# Patient Record
Sex: MALE | Race: BLACK OR AFRICAN AMERICAN | Employment: UNEMPLOYED | URBAN - METROPOLITAN AREA
[De-identification: names, ages, dates, MRNs, and addresses within clinical notes are randomized per-mention and may not be internally consistent; named-entity substitution may affect disease eponyms.]

---

## 2019-11-07 ENCOUNTER — TELEPHONE (OUTPATIENT)
Dept: FAMILY MEDICINE CLINIC | Age: 39
End: 2019-11-07

## 2019-11-07 NOTE — TELEPHONE ENCOUNTER
Home Based Primary Care & Supportive Services   (previously: At Home)  [phone] [facility] [place] [address], [address]    Name:  Amy Alcantara  YOB: 1980    Caller:  Ulises Torres [facility]  number:  [phone] - office,  cell:   [phone]  Patient has  and Medicaid as secondary. Patient:  Abel Jacob  :  1980   Number:  [id number]  Address:  [address]      Nurse returned call to Ulises Torres. He states that he is looking for a provider that will see Abel Jacob in his group home in [place]. He says that Mr. Nathaniel Wood is a 44 yo male with severe autism. Pt is non-verbal.  Pt is unable to be seen in a typical office setting. He says he would like for a provider to see patient once a year and agree to prescribe his medications. Nurse explained that [facility] is accepting new patients, but there are no new patient appointments available for at least the next few weeks. A new provider is in orientation and during that time, the [facility] team is looking at new referrals each week in the [facility] meeting and will accept new patients as spots become available. There is a waiting list (currently 4 patients on the list). Mr. Mardelle Leyden says he is looking for a provider that can make a home visit sooner than that. Pt has [employer]. Mr. Mardelle Leyden has contacted [facility] and they do not accept this insurance.   Nurse suggested that he call Casey Pozo ([phone]) or Edmond Castillo ([phone])      Megan Fregoso RN  Referral Navigator, Home Based [address]

## 2020-08-11 ENCOUNTER — OFFICE VISIT (OUTPATIENT)
Dept: URGENT CARE | Age: 40
End: 2020-08-11
Payer: COMMERCIAL

## 2020-08-11 VITALS — HEART RATE: 77 BPM | OXYGEN SATURATION: 97 % | TEMPERATURE: 98.3 F | RESPIRATION RATE: 17 BRPM

## 2020-08-11 DIAGNOSIS — Z20.822 EXPOSURE TO COVID-19 VIRUS: Primary | ICD-10-CM

## 2020-08-11 PROCEDURE — 99202 OFFICE O/P NEW SF 15 MIN: CPT | Performed by: NURSE PRACTITIONER

## 2020-08-11 NOTE — PROGRESS NOTES
Subjective: (As above and below)       This patient was seen in [facility] Clinic at [address] [place] [address] Urgent Care while in their vehicle due to COVID-19 pandemic with PPE and focused examination in order to decrease community viral transmission. The patient/guardian gave verbal consent to treat. Chief Complaint   Patient presents with    Covid Testing     No sx, positive COVID exposure     Alla Almeida is a 35 y.o. male group home patient who presents for COVID-19 Exposure and testing. Known contact with: covid 19  Currently has not tried any therapies and denies any symptoms at this point in time. Feels well otherwise. ROS- negative for appetite or behavior change  Review of Systems - negative except as listed above    Reviewed PmHx, RxHx, FmHx, SocHx, AllgHx and updated in chart. History reviewed. No pertinent family history. History reviewed. No pertinent past medical history. Social History     Socioeconomic History    Marital status: SINGLE     Spouse name: Not on file    Number of children: Not on file    Years of education: Not on file    Highest education level: Not on file          No current outpatient medications on file. No current facility-administered medications for this visit. Objective:     Vitals:    08/11/20 1539   Pulse: 77   Resp: 17   Temp: 98.3 °F (36.8 °C)   SpO2: 97%       Physical Exam  General appearance - appears well hydrated and does not appear toxic, no acute distress  Eyes - EOMs intact. Non injected. No scleral icterus   Ears - no external swelling  Nose - No purulent drainage  Neck/Lymphatics - trachea midline, full AROM  Chest - normal breathing effort. No active cough heard. No audible wheezing. Heart - HR-see vitals  Skin - no observable rashes or pallor  Neurologic- alert and oriented x 3  Psychiatric- normal mood, behavior and though content. Assessment/ Plan:     1.  Exposure to COVID-19 virus    - NOVEL CORONAVIRUS (COVID-19)      Will test for COVID-19 given exposure  Supportive home care reviewed for any development of mild symptoms - tylenol, maintain adequate fluid intake, deep breathing exercises  Self isolate/quarantine advised based on recommendations in current CDC guidelines. Follow up: We have reviewed, in detail, particular presentations/worsening/concerning signs and symptoms that may warrant seeking immediate care in the ED setting and patient verbalized being aware of what to watch for. For other non-severe changes, non-improvement or questions, patient aware to contact PCP office or consider a virtual online medical consultation. Reviewed with him that COVID-19 pandemic is an evolving situation with rapidly changing recommendations & guidelines. Medical decisions are made based on the the best information available at the time.   Recommended he stay tuned for updates published by trusted sources and to advise your PCP of any unexpected changes in clinical condition     Launie Fleischer, NP

## 2020-08-13 LAB — SARS-COV-2, NAA: NOT DETECTED

## 2021-11-18 ENCOUNTER — VIRTUAL VISIT (OUTPATIENT)
Dept: PRIMARY CARE CLINIC | Age: 41
End: 2021-11-18

## 2021-11-18 NOTE — PROGRESS NOTES
Rosanna Marx is a 38 y.o. male who was seen by synchronous (real-time) audio-video technology on 11/18/2021 for No chief complaint on file. Subjective:       Prior to Admission medications    Not on File     HIstory    Pt is establish care virtually. Pt us with support  Co - ordinator. For [employer]. Pt is mute. Pt has autism, moderate intellectual disability. Pt would not go into unknown facilty. His PCP is out of town          ROS    Objective:   No flowsheet data found.      [INSTRUCTIONS:  \"[x]\" Indicates a positive item  \"[]\" Indicates a negative item  -- DELETE ALL ITEMS NOT EXAMINED]    Constitutional: [x] Appears well-developed and well-nourished [x] No apparent distress      [] Abnormal -     Mental status: [x] Alert and awake  [x] Oriented to person/place/time [x] Able to follow commands    [] Abnormal -     Eyes:   EOM    [x]  Normal    [] Abnormal -   Sclera  [x]  Normal    [] Abnormal -          Discharge [x]  None visible   [] Abnormal -     HENT: [x] Normocephalic, atraumatic  [] Abnormal -   [x] Mouth/Throat: Mucous membranes are moist    External Ears [x] Normal  [] Abnormal -    Neck: [x] No visualized mass [] Abnormal -     Pulmonary/Chest: [x] Respiratory effort normal   [x] No visualized signs of difficulty breathing or respiratory distress        [] Abnormal -      Musculoskeletal:   [x] Normal gait with no signs of ataxia         [x] Normal range of motion of neck        [] Abnormal -     Neurological:        [x] No Facial Asymmetry (Cranial nerve 7 motor function) (limited exam due to video visit)          [x] No gaze palsy        [] Abnormal -          Skin:        [x] No significant exanthematous lesions or discoloration noted on facial skin         [] Abnormal -            Psychiatric:       [x] Normal Affect [] Abnormal -        [x] No Hallucinations    Other pertinent observable physical exam findings:-        We discussed the expected course, resolution and complications of the diagnosis(es) in detail. Medication risks, benefits, costs, interactions, and alternatives were discussed as indicated. I advised him to contact the office if his condition worsens, changes or fails to improve as anticipated. He expressed understanding with the diagnosis(es) and plan. Secret, was evaluated through a synchronous (real-time) audio-video encounter. The patient (or guardian if applicable) is aware that this is a billable service. Verbal consent to proceed has been obtained within the past 12 months. The visit was conducted pursuant to the emergency declaration under the [address] [place] [address], [address] [place] [address] authority and the [employer] General Act. Patient identification was verified, and a caregiver was present when appropriate. The patient was located in a state where the provider was credentialed to provide care.       Isela Montanez MD     Informed the care co- ordiantor that I cannot do a physical virtually, it is unethical and probably illegal.

## 2022-05-11 ENCOUNTER — TELEPHONE (OUTPATIENT)
Dept: FAMILY MEDICINE CLINIC | Age: 42
End: 2022-05-11

## 2022-05-11 NOTE — TELEPHONE ENCOUNTER
Vilma Sanz called to ask about the [facility] program, and said that the patient she is referring has white coat syndrome and is unable to go out to the doctor.  # [phone]

## 2022-05-11 NOTE — TELEPHONE ENCOUNTER
[address] [address]  Phone:  [phone]        Fax:  [phone]    Patient:  Monica Cortés  YOB: 1980    Incoming call from [facility]. She is inquiring about HB services for Marion Garcia    Preliminary Intake Note:     Address:  [address] [address], [address]    Does patient live within 15 miles of [address] [place] [address] at address listed above?      yes       Distance from [address] [place] [address]/Travel Time:   13.5 miles/ 29 minutes  Region:   [address]:   [facility]                                                                                                                                                     Does patient qualify based on address and insurance? yes    Date of Referral:  5/11/2022  Referred By, or How did you hear about our program?   [facility] called                                                                                                                                             Reason for Referral:   patient becomes too anxious when going to medical offices                      Diagnosis:   Autism, non-verbal    Last PCP visit:  Dr. Abreu [facility] 2/26/2020    Last Hospitalization:   no    Nursing Home/Rehab stay in the past year? no    Primary Caregiver:   Carlos Preciado/ , [id number]/ [phone]  [address]  / [phone]    Severo Sins  / mother/ [phone]   (lives in [place])    Records Needed:   :      Yes. Authorization to Release Health Information form emailed to [facility] on 5/12/22. [email or web address]    Current Controlled Substances:   none    This nurse explained that the [address] [place] [address] team discusses new referrals at our weekly IDG meetings.   The next meeting is scheduled for 5/17/22. This nurse will present the referral to the [facility] team on 5/17/22 and this nurse or SW will call Ms. Alessandro Clemens back to notify her of the decision.

## 2022-06-07 ENCOUNTER — TELEPHONE (OUTPATIENT)
Dept: FAMILY MEDICINE CLINIC | Age: 42
End: 2022-06-07

## 2022-06-07 NOTE — TELEPHONE ENCOUNTER
LCSW called pt's  through [facility] ([phone]), to inquire about status of intake paperwork completion. Ms. Delonte Ahumada stated that she spoke with his mother, who needed help from her daughter with completing the paperwork, but it should be returned by the end of this week. Ms. Delonte Ahumada will forward complete forms to LCSW once she rec's them back from pt's mother.      VICENTE Robles, [place]    [address]  (t) [phone]

## 2022-06-13 ENCOUNTER — TELEPHONE (OUTPATIENT)
Dept: FAMILY MEDICINE CLINIC | Age: 42
End: 2022-06-13

## 2022-06-13 ENCOUNTER — OFFICE VISIT (OUTPATIENT)
Dept: FAMILY MEDICINE CLINIC | Age: 42
End: 2022-06-13

## 2022-06-13 DIAGNOSIS — Z76.89 ENCOUNTER FOR SOCIAL WORK INTERVENTION: Primary | ICD-10-CM

## 2022-06-13 NOTE — TELEPHONE ENCOUNTER
LCSW called and spoke with pt's Care Coordinator, [facility], to inquire if she had received the HBPC intake paperwork back from pt's mother yet. She stated that pt's \"\" was going to walk mom through how to complete the paperwork last Friday, so she anticipates she should be receiving the scanned copies in an email today or tomorrow. FABBYW emphasized that without the intake paperwork completed and returned by this Wednesday morning, the HBPC NP would not be able to come and make the first visit, and initial provider visit may have to be postponed by several weeks. She expressed understanding.     VICENTE Malagon, [facility]    [address]  (k) [phone]

## 2022-06-13 NOTE — PROGRESS NOTES
Home Based Primary Care   [phone]  Initial Social Work Assessment    Date and Time of Initial In-Home Visit:  6/13/22, 2:00pm    Reason for Assessment: HBPC initial psychosocial assessment, bring green HBPC folder to the group home    Sources of Information:  present in the home today,  [facility] via telephone contact    SOCIAL HISTORY  Relationship Status:   [x] Single  []   [] Significant Other  []   [] /  [] Other:     Living Circumstances: Pt lives in adult group home with 5 other residents    Current/Type of Housing:  [] Public/subsidized housing  [] Apartment, Is there an elevator:   [] Assisted Living  [x] [employer], How many floors: 3- split level home    [facility] of Income:    [x] Social Security   [] SSI   [] Pension   [] Employment Income   [] [address]:   [] Medicare   [x] Medicaid : [employer] Medicaid   [] [employer]   [] Other:     Are you having trouble paying for things like rent, food, medications? Not at this time    Is there an agency or person who pays your bills? If yes, who?     ENVIRONMENT CHECKLIST  Food Security: No concerns, meals provided by group home    Problem with bed bugs, odors, pests, or pets? Not at this time    Working smoke alarm? [x] Yes [] No    Difficulty getting to exits from the home? Not at this time    Firearm Assessment:   Are there firearms in the home? [] Yes, Where are they kept? [x] No      SOCIAL SUPPORT ENVIRONMENT  Support System: Limited social support. His mother lives in [place]. How often do social supports visit? Limited social support. How do you socialize? n/a    Are you involved with any community agencies? Name/Contact: Not at this time    Is or has Adult Protective Services ever been involved? No    In the last 6 months, have you seen a ?  Psychiatrist? If yes, they be contacted by [facility] team? No    Substance Use:   Tobacco? [] Yes [x] No    Alcohol? [] Yes, How many drinks per week: [x] No   Drugs? [] Yes, which drugs:   [x] No    Do you have an Emergency Call Device system? [] Yes, Which brand? [x] No, Are you interested in obtaining and subscribing to an Emergency Call Device system? No    COGNITIVE/EMOTIONAL   Mental Status: Alert, nonverbal    How is your memory? Unable to assess    In the last 6 months, has anyone told you that you are forgetful? Unable to assess    Do you receive help with ADLs? [x] Yes, Who helps you? How many hours/days? Adult group home staff provide ADL assistance  [] No, Do you need help? TRANSPORTATION NEEDS ASSESSMENT  Can you use public transportation? [] Yes [x] No  Do you use transportation services provided by: Managed Care Organization? Community Service Resource? Transportation via [facility]    EMERGENCY ACTION PLAN  JAE reviewed the Emergency Action Plan with pt and/or family during this initial in-home Social Work assessment. SW completed Emergency Numbers, reviewed the content areas of Power Loss, Natural Disasters, Clinical Emergencies, Severe Weather, Safety in the Home, and Infection Control. Patient's acuity value consider to be LOW. ADVANCED CARE PLANNING  Not on file. Pt's mother is legal next of kin, lives in [place]. Narrative:  SW visited with pt in the adult group home where he lives with 5 other residents. New patient assessment of psychosocial needs and social determinants of health completed. JAE introduced Home Based Primary Care and Supportive Services and reviewed Emergency Action Plan booklet. Pt was sitting in dark living room by himself when FABBYW visited. BEAR talked with the  who was working at the home this afternoon. She said [facility], the , worked overnight and will be returning later this evening. She denied any current concerns or needs for pt.  He appeared to be well cared for by group home staff.      Plan:   [] Establish therapeutic relationship through in home visits and telephonic touch points  [] Assist in optimizing levels of psychosocial function  [x] Assess safety concerns and address as appropriate  [] Assess for caregiver burden and intervene as psychosocially indicated  [] Assess patient for caregiver needs to optimize psychosocial function and safety  [] Provide information on available community resources  [] Initiate conversation regarding health care wishes   [] Assess current Advance Care Planning documents and make ACP recommendations as appropriate  [] Discuss goals of care and treatment preferences  [] Screen for mental health conditions including but not limited to anxiety, depression, and anticipatory grief  [] Offer counseling services for mental health conditions including but not limited to anxiety, depression, and anticipatory grief  [] Engage family in patient health care goals to ensure support for those goals and minimize patient/family conflict  [] Assess cultural needs of patient/family, educate interdisciplinary team and engage appropriate resources    Frequency of Social Work Follow-Up/Visits  [x] As needed  [] Bi-monthly  [] Monthly  [] Weekly  [] Other:    Anita Blanchard, MSW, [facility]   Home Based [address] [place] [address]  [phone] [id number]

## 2022-06-15 ENCOUNTER — HOME VISIT (OUTPATIENT)
Dept: FAMILY MEDICINE CLINIC | Age: 42
End: 2022-06-15
Payer: OTHER GOVERNMENT

## 2022-06-15 DIAGNOSIS — K59.00 CONSTIPATION, UNSPECIFIED CONSTIPATION TYPE: ICD-10-CM

## 2022-06-15 DIAGNOSIS — F71 MODERATE INTELLECTUAL DISABILITY: ICD-10-CM

## 2022-06-15 DIAGNOSIS — F84.0 AUTISTIC DISORDER: Primary | ICD-10-CM

## 2022-06-15 DIAGNOSIS — Z76.89 ENCOUNTER TO ESTABLISH CARE: ICD-10-CM

## 2022-06-15 DIAGNOSIS — R03.0 WHITE COAT SYNDROME WITH HIGH BLOOD PRESSURE WITHOUT HYPERTENSION: ICD-10-CM

## 2022-06-15 DIAGNOSIS — Z91.89 AT HIGH RISK FOR ELOPEMENT: ICD-10-CM

## 2022-06-15 DIAGNOSIS — R47.01 NONVERBAL: ICD-10-CM

## 2022-06-15 DIAGNOSIS — Z74.1 REQUIRES ASSISTANCE WITH ACTIVITIES OF DAILY LIVING (ADL): ICD-10-CM

## 2022-06-15 PROBLEM — E66.9 OBESITY (BMI 30-39.9): Status: RESOLVED | Noted: 2022-06-15 | Resolved: 2022-06-15

## 2022-06-15 PROBLEM — E66.9 OBESITY (BMI 30-39.9): Status: ACTIVE | Noted: 2022-06-15

## 2022-06-15 PROCEDURE — 99343 PR HOME VST NEW PATIENT MOD-HI SEVERITY 45 MINUTES: CPT | Performed by: NURSE PRACTITIONER

## 2022-06-15 RX ORDER — HYDROXYZINE PAMOATE 25 MG/1
25 CAPSULE ORAL 2 TIMES DAILY
COMMUNITY

## 2022-06-15 RX ORDER — ACETAMINOPHEN 500 MG
2 TABLET ORAL
COMMUNITY

## 2022-06-15 RX ORDER — RISPERIDONE 3 MG/1
3 TABLET, FILM COATED ORAL 2 TIMES DAILY
COMMUNITY

## 2022-06-15 RX ORDER — DOCUSATE SODIUM 100 MG/1
100 CAPSULE, LIQUID FILLED ORAL 2 TIMES DAILY
Qty: 60 CAPSULE | Refills: 2 | Status: SHIPPED | OUTPATIENT
Start: 2022-06-15 | End: 2022-08-29

## 2022-06-15 RX ORDER — DOCUSATE SODIUM 100 MG/1
100 CAPSULE, LIQUID FILLED ORAL 2 TIMES DAILY
COMMUNITY
End: 2022-06-15

## 2022-06-15 NOTE — PROGRESS NOTES
Home Based [address] [facility]   [phone]          NOTE: Home Based Primary Care is a PROVIDER (MD/NP) based interdisciplinary practice (RN, LCSW, Pharmacy, Dietician) for patients who cannot access (or have a taxing effort) primary / specialty medical care in an office setting. [facility] is NOT [facility] but works with [facility] when there is a skilled need. Our MD/NPs are integral in Care Transitions; PLEASE CALL [phone] if this patient arrives in the Emergency Department or Hospital.         Date of Current Visit: 6/15/2022     SUMMARY     Blanquita Samayoa is a 42 y.o. male seen in the home today due to taxing effort to seek primary care services in the community s/t autism with non-verbal status and intellectual disabilities. He is seen today to establish care. Visit today is joint visit with team RN, April. Patient/Family present for Current Visit:  Patient, [facility] manager [facility], group [place] leader    ASSESSMENT AND PLAN       ICD-10-CM ICD-9-CM    1. Autistic disorder  F84.0 299.00    2. Encounter to establish care  Z76.89 V65.8    3. Moderate intellectual disability  F71 318.0    4. Nonverbal  R47.01 784.3    5. Requires assistance with activities of daily living (ADL)  Z74.1 V49.89 LIPID PANEL   6. At high risk for elopement  Z91.89 V49.89 HEPATITIS C AB   7. White coat syndrome with high blood pressure without hypertension  R03.0 796.2 LIPID PANEL      HEMOGLOBIN A1C WITH EAG   8. Constipation, unspecified constipation type  K59.00 564.00 docusate sodium (COLACE) 100 mg capsule     -Autism/Intellectual disability: Currently living in group home. Per caregivers, no aggressive behaviors but is very fearful of new people and especially healthcare. He is followed by Dr. Tc Macias, neuropsychiatrist, who completes virtual visits. He is currently managed with Risperdal 3mg twice daily, hydroxyzine 25mg twice daily, prescribed by Dr. Stefanie Cooks.   Per chart review, he was previously taking diazepam.  Staff feel that sleep, behaviors, and mood are well-controlled on this regimen and he has been on this for several years. He participates in a day support program M-F from 7-3.   -Nonverbal: Patient vocalizes slightly but does not verbalize. He is able to gesture when he is hungry and appears to somewhat understand staff when they are talking to him.    -Requires assistance with ADL: Patient is independent with toileting, staff assist with medications, meals/snacks, and provide bathing assistance. He completes simple chores like straightening room. -High risk for elopement: Patient does not leave the home except for appointments due to high risk for elopement. Per caregiver, he eloped during an attempted PCP visit. No attempts in previous year. Staff ensure locked doors and supervision at all times.    -White coat syndrome: BP significantly elevated at visit today- staff attempted to obtain BP with automatic cuff. Patient became very afraid when NP and RN entered the room and ran from the common area to his room upstairs. BP likely not accurate given activity and significant fear. Staff monitor BP weekly and Bps reviewed with recent readings 117/70, 120/70   -Constipation: Patient is continent and uses restroom independently, but staff report finding very large BM in toilet occasionally. Will increase docusate to twice daily and nurse encouraged increased fiber such as prunes in oatmeal (staff endorse he enjoys this food). -Labs: Patient has not had recent labs and patient is unable to cooperate for labwork at today's visit  -AMD/Code Status: Patient unable to communicate wishes, attempted to call mother x 2 without answer. Will continue to reach out to mother/POA. -Follow up: in 1 month with team NP. Will re-attempt physical assessment and vitals.      Health Maintenance Due   Topic Date Due    Hepatitis C Screening  Never done    Depression Screen  Never done    COVID-19 Vaccine (1) Never done    DTaP/Tdap/Td series (1 - Tdap) Never done    Lipid Screen  Never done     I have left a SUMMARY / INSTRUCTIONS from today's visit with the patient/family in the home          HISTORY:      CHIEF COMPLAINT:    Chief Complaint   Patient presents with    Establish Care        HPI/SUBJECTIVE:    Mr. Heather Swan is a 41 y/o M seen today to establish care with home-based primary care services. He is residing in a group home with five other residents with intellectual disabilities due to autism with nonverbal status. Group home leader/caretaker is present for visit as well as , Minh Cruz. History and ROS primarily provided by group home staff due to patient's nonverbal status. Upon arrival, NP and RN introduced self but patient left the common area and went to his room upstairs. Group home caregiver attempted to retrieve him, but he remained in his room. RN attempted to talk to him from upstairs, but patient went into bathroom and locked door. During visit, patient came down the stairs and looked at team members several times, but if spoken to would return upstairs. Group home staff report incident of going to outpatient appointment and patient eloped, entering a nearby house. Per chart review, he was taken to the ED in 2012 for cold symptoms, but staff could not get him to enter the building. Group home caregivers report this is a common behavior around new people and especially healthcare providers. He is fearful of needles and has subsequently not recently obtained vaccinations or labwork. He continues to be followed by psychiatrist Dr. Madi Ho for virtual visits, and this provider continues to prescribe his Risperdal and hydroxyzine. He has had no hospitalizations or ED visits since 2012. Patient has good appetite per staff, he eats regularly and has frequent snacks.   He vocalizes somewhat during the visit, but is unable to verbalize. He is able to gesture to staff when he is hungry. His mother visits about monthly and provides personal care such as haircuts and nail clipping. He can participate in bathing, but staff need to assist.  He is continent of bowel and bladder and independent in the bathroom. Staff note several episodes of very large BM, and team nurse discussed recommendations to improve bowel regularity. He has had no aggressive or violent behaviors and enjoys watching TV during the day. He has recently had an altered sleep schedule, but staff attribute this to disrupted daytime schedule (typically has day support program which has been on hold, restarts Monday). He is ambulatory with no history of falls. Per caregivers, no other known medical or surgical history. He is compliant with medications. Staff ask provider to fill out form outlining patient's medications, allergies, and assessment- unable to provide physical assessment but completed rest of form per chart review. Caregivers helped strategize next visit, recommended one provider only and to offer snacks for distraction. Will attempt vitals and physical exam at that time as it is not possible today. REVIEW OF SYSTEMS:     Patient unable to complete ROS due to nonverbal status and leaving room  Caregivers deny skin breakdown, changes to appetite, difficulty with medications, coughing, nausea/vomiting. PHYSICAL EXAM:     Unable to complete physical exam due to patient not allowing provider to be nearby, leaves the room and goes upstairs and shuts door     HISTORY:     Past Medical and Surgical History reviewed in [address] [place] [address] on date of initial visit    Patient Active Problem List   Diagnosis Code    Autistic disorder F84.0    Moderate intellectual disability F71     No family history on file.    Social History     Tobacco Use    Smoking status: Not on file    Smokeless tobacco: Not on file   Substance Use Topics    Alcohol use: Not on file     Allergies   Allergen Reactions    Haldol [Haloperidol Lactate] Unknown (comments)      Current Outpatient Medications   Medication Sig    risperiDONE (RisperDAL) 3 mg tablet Take 3 mg by mouth two (2) times a day.  docusate sodium (COLACE) 100 mg capsule Take 100 mg by mouth two (2) times a day.  hydrOXYzine pamoate (VISTARIL) 25 mg capsule Take 25 mg by mouth two (2) times a day.  acetaminophen (TYLENOL) 500 mg tablet Take 2 Tablets by mouth every six (6) hours as needed for Fever or Mild Pain.  docusate sodium (COLACE) 100 mg capsule Take 1 Capsule by mouth two (2) times a day for 90 days. No current facility-administered medications for this visit. LAB DATA REVIEWED:     No recent lab results          On this date 6/15/22  I have spent 45 minutes reviewing previous notes, test results, and face to face with the patient discussing the diagnosis, health maintenance, medication counseling and importance of compliance with the treatment plan as well as documenting on the day of the visit.     Doug Guadarrama NP

## 2022-06-15 NOTE — PROGRESS NOTES
Joint home visit with provider Vincent Perla NP to establish PCP relationship    Patient lives in group home for intellectually disabled, interview conducted with home supervisor Yamilet Ann and her . Pt was ambulatory but did not allow this nurse nor the NP to approach him, he would jump up and go upstairs into the bathroom and close the door. He has known hx of \"white coat syndrome\". CG reports appetite good, eats 3 meals and 3 snacks daily. He is continent of bowel and bladder. Frequently with stools so large and hard that they cause commode to back up. Instructed is including stewed prunes 3 - 5 in his daily oatmeal and provider increased docusate to 1 BID. No hx of falls. He normally goes out to day support M - F from 7 AM - 3 PM but has not been in a couple of weeks due to staffing issues at day support. CG reports he has not been sleeping well since not going to day support but normally sleeps all night. He requires max assist with bathing and mod assist with dressing. He is able to do some chores in the home with verbal prompting. Non-verbal but makes a humming vocalization. He had virtual visit with neuro psych doctor Dalton Bronson today and he prescribes and refills his risperidone and visteril. Pt watches TV for entertainment and is a risk of elopement if he can find a open/unlocked door. Pt's mother lives in [place] and visits about every 2 months. He only allows his Mom to cut his hair. Does not allow it to be washed or cut by anyone else. Home staff took /109 P 101 O2 sat 99% - their BP logs show his normal BP runs 117/70 and is monitored weekly by the home.

## 2022-06-17 ENCOUNTER — DOCUMENTATION ONLY (OUTPATIENT)
Dept: FAMILY MEDICINE CLINIC | Age: 42
End: 2022-06-17

## 2022-06-17 NOTE — PATIENT INSTRUCTIONS
Constipation: Care Instructions  Overview     Constipation means that you have a hard time passing stools (bowel movements). People pass stools from 3 times a day to once every 3 days. What is normal for you may be different. Constipation may occur with pain in the rectum and cramping. The pain may get worse when you try to pass stools. Sometimes there are small amounts of bright red blood on toilet paper or the surface of stools. This is because of enlarged veins near the rectum (hemorrhoids). A few changes in your diet and lifestyle may help you avoid ongoing constipation. Your doctor may also prescribe medicine to help loosen your stool. Some medicines can cause constipation. These include pain medicines and antidepressants. Tell your doctor about all the medicines you take. Your doctor may want to make a medicine change to ease your symptoms. Follow-up care is a key part of your treatment and safety. Be sure to make and go to all appointments, and call your doctor if you are having problems. It's also a good idea to know your test results and keep a list of the medicines you take. How can you care for yourself at home? · Drink plenty of fluids. If you have kidney, heart, or liver disease and have to limit fluids, talk with your doctor before you increase the amount of fluids you drink. · Include high-fiber foods in your diet each day. These include fruits, vegetables, beans, and whole grains. · Get at least 30 minutes of exercise on most days of the week. Walking is a good choice. You also may want to do other activities, such as running, swimming, cycling, or playing tennis or team sports. · Take a fiber supplement, such as Citrucel or Metamucil, every day. Read and follow all instructions on the label. · Schedule time each day for a bowel movement. A daily routine may help. Take your time having a bowel movement, but don't sit for more than 10 minutes at a time.  And don't strain too much.  · Support your feet with a small step stool when you sit on the toilet. This helps flex your hips and places your pelvis in a squatting position. · Your doctor may recommend an over-the-counter laxative to relieve your constipation. Examples are Milk of Magnesia and MiraLax. Read and follow all instructions on the label. Do not use laxatives on a long-term basis. When should you call for help? Call your doctor now or seek immediate medical care if:    · You have new or worse belly pain.     · You have new or worse nausea or vomiting.     · You have blood in your stools. Watch closely for changes in your health, and be sure to contact your doctor if:    · Your constipation is getting worse.     · You do not get better as expected. Where can you learn more? Go to http://www.goddard.com/  Enter P343 in the search box to learn more about \"Constipation: Care Instructions. \"  Current as of: July 1, 2021               Content Version: 13.2  © 2006-2022 [employer]. Care instructions adapted under license by [employer] (which disclaims liability or warranty for this information). If you have questions about a medical condition or this instruction, always ask your healthcare professional. [place] [address] any warranty or liability for your use of this information.

## 2022-06-17 NOTE — PROGRESS NOTES
Home Based Primary Care  Plan of Care    [facility] Team Members: Roberto Moon MD; Alexy Law NP;  Blu Tomas NP; Maria Elena Milligan, RN; Sheeba Hunter, RN; Lena Monsalve LCSW    Guillermo Zoltan  1980 / [id number]  male    The physician has reviewed and discussed the plan of care with the interdisciplinary group on 06/21/22 and agrees. Date of Initial Visit (Start of Care): 6/15/22    Diagnosis:   Patient Active Problem List   Diagnosis Code    Autistic disorder F84.0    Moderate intellectual disability F71       Patient status summary: 42 y.o. male who was referred to the [address] [place] [address] due to autism with intelectual disability and nonverbal status. Patient discussed today for initial POC review. Advance Care Planning:  Code status: No Order    DME/Supplies: None      Allergies   Allergen Reactions    Haldol [Haloperidol Lactate] Unknown (comments)       Nutritional Requirements:   Oral with supported meal preparation    Functional/Activity Level:  Ambulatory without assistance    Safety Measures:   Self-care deficity    Acuity Level Rating: Low    Current Outpatient Medications   Medication Sig    risperiDONE (RisperDAL) 3 mg tablet Take 3 mg by mouth two (2) times a day.  hydrOXYzine pamoate (VISTARIL) 25 mg capsule Take 25 mg by mouth two (2) times a day.  acetaminophen (TYLENOL) 500 mg tablet Take 2 Tablets by mouth every six (6) hours as needed for Fever or Mild Pain.  docusate sodium (COLACE) 100 mg capsule Take 1 Capsule by mouth two (2) times a day for 90 days. No current facility-administered medications for this visit. The Plan of Care has been initiated for within 15 days of New Visit  and reviewed and updated by the Interdisciplinary Group (IDG) as frequently as the patient condition warrants. Plan of Care by Discipline:    1.  Provider  Identify patient's health care goal(s), Ongoing evaluation of treatment interventions for specific disease state, Create and implement disease /symptom specific plan to manage high risk conditions / symptoms and Communicate with prior/current health care team members to enhance understanding of patient status    2. Nursing  Identify patient's health care goal(s), Communicate with prior/current health care team members to enhance understanding of patient status, Education regarding current medications and adverse effects, Provider caregiver / family support for patient's current and changing condition and Continued assessment of need for home - based services based on taxing effort to leave the home    3. Social Work  [phone], Establish therapeutic relationship through in home visits and telephonic touch points, Assist in optimizing levels of psychosocial function, Assess safety concerns and address as appropriate and Assess patient for caregiver needs to optimize psychosocial function and safety      Plan of Care Orders / Action Items:  -Autism/Intellectual disability: Currently living in group home. Per caregivers, no aggressive behaviors but is very fearful of new people and especially healthcare. He is followed by Dr. Tc Macias, neuropsychiatrist, who completes virtual visits. He is currently managed with Risperdal 3mg twice daily, hydroxyzine 25mg twice daily, prescribed by Dr. Stefanie Cooks. Per chart review, he was previously taking diazepam.  Staff feel that sleep, behaviors, and mood are well-controlled on this regimen and he has been on this for several years. He participates in a day support program M-F from 7-3.   -Nonverbal: Patient vocalizes slightly but does not verbalize. He is able to gesture when he is hungry and appears to somewhat understand staff when they are talking to him.    -Requires assistance with ADL: Patient is independent with toileting, staff assist with medications, meals/snacks, and provide bathing assistance.   He completes simple chores like straightening room.    -High risk for elopement: Patient does not leave the home except for appointments due to high risk for elopement. Per caregiver, he eloped during an attempted PCP visit. No attempts in previous year. Staff ensure locked doors and supervision at all times.    -White coat syndrome: BP significantly elevated at visit today- staff attempted to obtain BP with automatic cuff. Patient became very afraid when NP and RN entered the room and ran from the common area to his room upstairs. BP likely not accurate given activity and significant fear. Staff monitor BP weekly and Bps reviewed with recent readings 117/70, 120/70   -Constipation: Patient is continent and uses restroom independently, but staff report finding very large BM in toilet occasionally. Will increase docusate to twice daily and nurse encouraged increased fiber such as prunes in oatmeal (staff endorse he enjoys this food).       Health Maintenance   Topic Date Due    Hepatitis C Screening  Never done    COVID-19 Vaccine (1) Never done    DTaP/Tdap/Td series (1 - Tdap) Never done    Lipid Screen  Never done    Flu Vaccine (Season Ended) 09/01/2022    Depression Screen  06/15/2023    Pneumococcal 0-64 years  Aged Out       Estimated Visit Frequency:  Monthly Provider Visit  SW visits PRN

## 2022-07-13 ENCOUNTER — HOME VISIT (OUTPATIENT)
Dept: FAMILY MEDICINE CLINIC | Age: 42
End: 2022-07-13
Payer: MEDICAID

## 2022-07-13 VITALS
HEART RATE: 67 BPM | DIASTOLIC BLOOD PRESSURE: 86 MMHG | OXYGEN SATURATION: 95 % | TEMPERATURE: 97.1 F | RESPIRATION RATE: 18 BRPM | SYSTOLIC BLOOD PRESSURE: 127 MMHG

## 2022-07-13 DIAGNOSIS — Z91.89 AT HIGH RISK FOR ELOPEMENT: ICD-10-CM

## 2022-07-13 DIAGNOSIS — K59.00 CONSTIPATION, UNSPECIFIED CONSTIPATION TYPE: ICD-10-CM

## 2022-07-13 DIAGNOSIS — Z74.1 REQUIRES ASSISTANCE WITH ACTIVITIES OF DAILY LIVING (ADL): ICD-10-CM

## 2022-07-13 DIAGNOSIS — F40.232 FEAR OF OTHER MEDICAL CARE: ICD-10-CM

## 2022-07-13 DIAGNOSIS — R47.01 NONVERBAL: ICD-10-CM

## 2022-07-13 DIAGNOSIS — F71 MODERATE INTELLECTUAL DISABILITY: ICD-10-CM

## 2022-07-13 DIAGNOSIS — F84.0 AUTISTIC DISORDER: Primary | ICD-10-CM

## 2022-07-13 PROCEDURE — 99349 HOME/RES VST EST MOD MDM 40: CPT | Performed by: NURSE PRACTITIONER

## 2022-07-13 RX ORDER — ACETAMINOPHEN/PYRILAM/PAMABROM 500-15-25
10 TABLET ORAL
COMMUNITY

## 2022-07-13 RX ORDER — ACETAMINOPHEN/PYRILAM/PAMABROM 500-15-25
20 TABLET ORAL
COMMUNITY
Start: 2022-06-27 | End: 2022-07-13

## 2022-07-13 NOTE — PROGRESS NOTES
Home Based [address] [facility]   [phone]          NOTE: Home Based Primary Care is a PROVIDER (MD/NP) based interdisciplinary practice (RN, LCSW, Pharmacy, Dietician) for patients who cannot access (or have a taxing effort) primary / specialty medical care in an office setting. [facility] is NOT [employer] but works with [facility] when there is a skilled need. Our MD/NPs are integral in Care Transitions; PLEASE CALL [phone] if this patient arrives in the Emergency Department or Hospital.         Date of Current Visit: 7/13/2022     SUMMARY     Kiko Bess is a 42 y.o. male seen in the home today due to taxing effort to seek primary care services in the community s/t autism with non-verbal status and intellectual disabilities. Patient/Family present for Current Visit:  Patient, grouphome caregiver    ASSESSMENT AND PLAN       ICD-10-CM ICD-9-CM    1. Autistic disorder  F84.0 299.00    2. Moderate intellectual disability  F71 318.0    3. Nonverbal  R47.01 784.3    4. Requires assistance with activities of daily living (ADL)  Z74.1 V49.89    5. At high risk for elopement  Z91.89 V49.89    6. Constipation, unspecified constipation type  K59.00 564.00    7. Fear of other medical care  J39.005 300.29      -Autism/Intellectual disability: Currently living in group home with other males. Caregiver reports he gets along with other residents, though is extremely fearful of strangers and especially healthcare. He is followed by Dr. Cindy Perez, neuropsychiatrist, via virtual visits. He is currently managed with Risperdal 3mg twice daily, hydroxyzine 25mg twice daily, prescribed by Dr. Nikolai Sanders. Per chart review, he was previously taking diazepam.  Staff feel that sleep, behaviors, and mood are well-controlled on this regimen and he has been on this for several years.   He participates in a day support program M-F from 7-3.   -Nonverbal: Patient vocalizes slightly but does not verbalize. Appears to understand requests and make small vocalizations in response. He is able to gesture when he is hungry.  -Requires assistance with ADL: Patient is ambulatory and independent with toileting, staff assist with medications, meals/snacks, and provide bathing assistance. He completes simple chores like straightening room. -High risk for elopement: Patient does not leave the home except for appointments due to high risk for elopement. Per caregiver, he eloped during an attempted PCP visit and ER visit. Staff ensure locked doors and supervision at all times.    -Constipation: Patient is continent and uses restroom independently, but staff report finding very large BM in toilet occasionally. Caregiver reports BM regular on twice daily docusate. Encourage ongoing hydration and fiber intake. -Fear of medical care: Patient locked self in bathroom at last visit and would not allow NP or RN to come near. Previous elopement during PCP and ER visits. BP and HR significantly elevated at last visit due to anxious state. Allow time to establish relationship, wear plain clothes, provide snacks for visits. Patient was much calmer at visit today. per caregivers he does not allow blood draws or injections.      -Labs: Patient has not had recent labs and patient is unable to cooperate for labwork   -AMD/Code Status: Patient unable to communicate wishes, attempted to call mother x 2 without answer. Will continue to reach out to mother/POA at each visit.   -Follow up: in 3 months with NP (10/10/22).      Health Maintenance Due   Topic Date Due    Hepatitis C Screening  Never done    COVID-19 Vaccine (1) Never done    DTaP/Tdap/Td series (1 - Tdap) Never done    Lipid Screen  Never done     I have left a SUMMARY / INSTRUCTIONS from today's visit with the patient/family in the home          HISTORY:      CHIEF COMPLAINT:    Chief Complaint   Patient presents with    Follow-up    Other     autism HPI/SUBJECTIVE:    Mr. Meehan Number"Drake Jennings is a 43 y/o M seen today for follow-up of medical conditions. He is residing in a group home with five other male residents with intellectual disabilities due to autism with nonverbal status. Evening group home caretaker present for visit today. Caregiver denies any concerns or acute events since previous visit. Endorses good appetite, regular sleeping habits, regular bowel movements, and behaviors at baseline. He has had no recent falls. He attends a day support program and this helps keep him on a regular schedule. He has not had an elopement attempt in about a year. He is not able to leave the group home except for rare appointments due to his history of elopement. He is fearful of needles and has subsequently not recently obtained vaccinations or labwork. He continues to be followed with virtual visits by psychiatrist Dr. Mohini Serrano who continues to prescribe his Risperdal and hydroxyzine. He has had no hospitalizations or ED visits since 2012. At previous visit, NP and RN were not able to accurately obtain vital signs or assess patient as he became fearful and left the room, going into the bathroom and locking the door. At visit today, this provider arrived in plain clothes and provided patient with snacks. He took the snacks provided and allowed vital signs. He then left the room to his bedroom. He then re-entered the room and allowed a brief assessment while he ate another snack. BP and HR were significantly improved from previous visit. He followed requests such as extending arm for BP and taking deep breaths. Physical exam revealed no concerning findings. Caregivers reported no additional concerns or questions. Attempted to call POA/Mother x 2 after visit without answer.       REVIEW OF SYSTEMS:     Patient unable to complete ROS due to nonverbal status and leaving room  Caregivers deny skin breakdown, changes to appetite, difficulty with medications, coughing, nausea/vomiting. PHYSICAL EXAM:     Physical Exam  Constitutional:       General: He is not in acute distress. Appearance: He is normal weight. He is not ill-appearing. HENT:      Head: Normocephalic and atraumatic. Right Ear: External ear normal.      Left Ear: External ear normal.      Nose: Nose normal. No rhinorrhea. Eyes:      General: No scleral icterus. Right eye: No discharge. Left eye: No discharge. Pupils: Pupils are equal, round, and reactive to light. Cardiovascular:      Rate and Rhythm: Normal rate and regular rhythm. Heart sounds: Normal heart sounds. Pulmonary:      Effort: Pulmonary effort is normal.      Breath sounds: Normal breath sounds. Abdominal:      General: Abdomen is flat. Bowel sounds are normal.      Palpations: Abdomen is soft. Musculoskeletal:      Right lower leg: No edema. Left lower leg: No edema. Skin:     General: Skin is warm and dry. Findings: No bruising, erythema or rash. Neurological:      Mental Status: He is alert. Mental status is at baseline. Comments: Vocalizes but does not verbalize, does not make eye contact, follows simple commands        HISTORY:     Past Medical and Surgical History reviewed in [address] [place] [address] on date of initial visit    Patient Active Problem List   Diagnosis Code    Autistic disorder F84.0    Moderate intellectual disability F71     History reviewed. No pertinent family history. Social History     Tobacco Use    Smoking status: Never Smoker    Smokeless tobacco: Never Used   Substance Use Topics    Alcohol use: Not on file     Allergies   Allergen Reactions    Haldol [Haloperidol Lactate] Unknown (comments)      Current Outpatient Medications   Medication Sig    phenylephrine-dm-guaiFENesin (Tussin CF, PE-DM-guaif,) 5- mg/5 mL liqd Take 10 mL by mouth every six (6) hours as needed.  Indications: cold symptoms, cough    risperiDONE (RisperDAL) 3 mg tablet Take 3 mg by mouth two (2) times a day.  hydrOXYzine pamoate (VISTARIL) 25 mg capsule Take 25 mg by mouth two (2) times a day.  acetaminophen (TYLENOL) 500 mg tablet Take 2 Tablets by mouth every six (6) hours as needed for Fever or Mild Pain.  docusate sodium (COLACE) 100 mg capsule Take 1 Capsule by mouth two (2) times a day for 90 days. No current facility-administered medications for this visit.         LAB DATA REVIEWED:     No recent lab results            Sindhu Barksdale NP

## 2022-07-14 NOTE — PATIENT INSTRUCTIONS
Learning About Autism Spectrum Disorder (ASD) in Adults  What is autism spectrum disorder (ASD) in adults? Autism spectrum disorder (ASD) is a developmental disorder. It affects a person's behavior. And it makes communication and social interactions hard. ASD can range from mild to severe. The type of symptoms a person has and how severe they are varies. Some adults who have ASD may not be able to function without a lot of help from parents and other caregivers. Others may learn social and verbal skills and be able to care for themselves. Most people who have ASD will always have some trouble when they communicate or interact with others. But treatment for ASD has helped many people who have it to lead full lives. ASD now includes conditions that used to be diagnosed separately. These include:  · Autism. · Asperger's syndrome. · Pervasive developmental disorder. · Childhood disintegrative disorder. You or your doctor might use any of these terms to describe the condition. What are the symptoms of autism spectrum disorder (ASD) in adults? Adults with ASD have some symptoms in these areas:  Communication and social interactions. Symptoms may include:  · Problems using or responding to gestures or pointing, facial expressions, and body posture. And there may be problems making eye contact. · Problems making friends or dating. And there may be problems bonding with a child or partner. Or there may be problems working with colleagues. · Lack of interest in sharing enjoyment, interests, or achievements with others. · Problems starting a conversation. Repetitive behaviors and limited interests in activities. Symptoms may include:  · Getting attached to objects or topics. · A strong need for sameness and routines. · Repetitive use of language. An adult with ASD may keep repeating a phrase they have heard. How severe the symptoms are varies.  Some symptoms, like repetitive behaviors, may get better over time. Adults with ASD may have other problems. These include:  · Speech and language issues. · Sleep problems. · Seizures. · Attention deficit hyperactivity disorder (ADHD). · Depression. · Anxiety. How is autism spectrum disorder (ASD) in adults diagnosed? ASD is usually diagnosed in early childhood when parents notice developmental delays and behavior issues. But if a child's symptoms are mild or mistaken for another condition, ASD may not be noticed until later in life. To diagnose ASD, the doctor will:  · Ask about your symptoms and past health. · Ask about your childhood development. The doctor may ask parents or siblings if, as a child, you had:  ? Any language delays. ? Problems learning or interacting with others. ? Any behaviors that seemed unusual.  · Ask a partner, family member, or friend how you behave or interact with others at home and at work. · Ask if there's a family history of ASD. · Watch how you interact with others during your exam.  Other tests may be used to see if another problem is causing symptoms. The doctor will use all of this information, along with his or her judgment, to diagnose ASD. How is autism spectrum disorder (ASD) in adults treated? Treatment may include:  Behavior and social skills training. This can help you learn how to:  · Get organized and manage your time. · Communicate better and speak up for yourself. · Develop skills needed to go to college or get a job. · Form and keep relationships. Job skills training. This can help you learn how to:  · Prepare for interviews and help you learn other skills needed to find work. · Find a job that focuses on your strengths. · Talk to an employer about special needs you may have. On-the-job training and coaching can help you:  · Get work experience. · Develop skills needed to get work done. · Set and achieve work goals. Counseling.   · Cognitive behavioral therapy might be used to treat anxiety and depression. · Couples and family therapy can help improve relationships. Medicines. Medicines might be used to treat ASD symptoms. These include being cranky or hyperactive. Sometimes medicine is used to treat other problems, like anxiety and depression. Where can you learn more? Go to http://www.gray.com/  Enter S111 in the search box to learn more about \"Learning About Autism Spectrum Disorder (ASD) in Adults. \"  Current as of: June 16, 2021               Content Version: 13.2  © 6709-4941 [employer]. Care instructions adapted under license by [facility] (which disclaims liability or warranty for this information). If you have questions about a medical condition or this instruction, always ask your healthcare professional. [address] any warranty or liability for your use of this information.

## 2022-08-05 ENCOUNTER — TELEPHONE (OUTPATIENT)
Dept: FAMILY MEDICINE CLINIC | Age: 42
End: 2022-08-05

## 2022-08-05 RX ORDER — MICONAZOLE NITRATE 20 MG/G
POWDER TOPICAL 2 TIMES DAILY
Qty: 85 G | Refills: 3 | Status: SHIPPED | OUTPATIENT
Start: 2022-08-05

## 2022-08-05 NOTE — TELEPHONE ENCOUNTER
Spoke with Foreign Hammer NP on call for Kel Holden NP, verbal order received for Zeasorb AF 2% Powder, apply to Athlete's Foot twice a day as needed. Rx sent to [facility]. Telephone call to Deanna Mccallum to advise that rx was sent to [address] [place] [address] as requested. Deanna Mccallum also requested our fax number for a form for bowel program that we need to complete.

## 2022-08-05 NOTE — TELEPHONE ENCOUNTER
Charlette Cranker with [address] is requesting something to be called in to [email or web address] for athlete's foot. They merged with another company and the prescription was lost. She is asking for a new script to be called in for PRN.

## 2022-08-29 DIAGNOSIS — K59.00 CONSTIPATION, UNSPECIFIED CONSTIPATION TYPE: ICD-10-CM

## 2022-08-29 RX ORDER — DOCUSATE SODIUM 100 MG/1
CAPSULE, LIQUID FILLED ORAL
Qty: 60 CAPSULE | Refills: 3 | Status: SHIPPED | OUTPATIENT
Start: 2022-08-29

## 2022-09-06 ENCOUNTER — DOCUMENTATION ONLY (OUTPATIENT)
Dept: FAMILY MEDICINE CLINIC | Age: 42
End: 2022-09-06

## 2022-09-15 ENCOUNTER — DOCUMENTATION ONLY (OUTPATIENT)
Dept: FAMILY MEDICINE CLINIC | Age: 42
End: 2022-09-15

## 2022-09-15 NOTE — PROGRESS NOTES
Home Based Primary Care  Plan of Care    [facility] Team Members: Bakari Correia MD; Foreign Hammer NP;  Kel Holden NP; Vielka Wong, RN; Sammy Carl, RN; Alicia Hartman, CASPER; BEAR Quinonez  1980 / [id number]  male    The physician has reviewed and discussed the plan of care with the interdisciplinary group on 09/20/22 and agrees. Date of Initial Visit (Start of Care): 6/15/22    Diagnosis:   Patient Active Problem List   Diagnosis Code    Autistic disorder F84.0    Moderate intellectual disability F71       Patient status summary: 42 y.o. male who was referred to the [address] [place] [address] due to autism with intelectual disability and nonverbal status. Patient discussed today for POC review. Advance Care Planning:  Code status: No Order    DME/Supplies: None      Allergies   Allergen Reactions    Haldol [Haloperidol Lactate] Unknown (comments)       Nutritional Requirements:   Oral with supported meal preparation    Functional/Activity Level:  Ambulatory without assistance    Safety Measures:   Self-care deficity    Acuity Level Rating: Low    Current Outpatient Medications   Medication Sig    docusate sodium (COLACE) 100 mg capsule 1 CAPSULE BY MOUTH 2 TIMES A DAY    miconazole (Zeasorb AF) 2 % topical powder Apply  to affected area two (2) times a day. phenylephrine-dm-guaiFENesin (Tussin CF, PE-DM-guaif,) 5- mg/5 mL liqd Take 10 mL by mouth every six (6) hours as needed. Indications: cold symptoms, cough    risperiDONE (RisperDAL) 3 mg tablet Take 3 mg by mouth two (2) times a day.    hydrOXYzine pamoate (VISTARIL) 25 mg capsule Take 25 mg by mouth two (2) times a day. acetaminophen (TYLENOL) 500 mg tablet Take 2 Tablets by mouth every six (6) hours as needed for Fever or Mild Pain. No current facility-administered medications for this visit.        The Plan of Care has been initiated for within 15 days of New Visit  and reviewed and updated by the Interdisciplinary Group (IDG) as frequently as the patient condition warrants. Plan of Care by Discipline:    1. Provider  Ongoing evaluation of treatment interventions for specific disease state, Create and implement disease /symptom specific plan to manage high risk conditions / symptoms, and Communicate with prior/current health care team members to enhance understanding of patient status    2. Nursing  Communicate with prior/current health care team members to enhance understanding of patient status, Education regarding current medications and adverse effects, Provider caregiver / family support for patient's current and changing condition, and Continued assessment of need for home - based services based on taxing effort to leave the home    3. Social Work  Establish therapeutic relationship through in home visits and telephonic touch points, Assist in optimizing levels of psychosocial function, Assess safety concerns and address as appropriate, and Assess patient for caregiver needs to optimize psychosocial function and safety      Plan of Care Orders / Action Items:  -Autism/Intellectual disability: Currently living in group home. Per caregivers, no aggressive behaviors but is very fearful of new people and especially healthcare. He is followed by Dr. Lalit Corrales, neuropsychiatrist, who completes virtual visits. He is currently managed with Risperdal 3mg twice daily, hydroxyzine 25mg twice daily, prescribed by Dr. Jenise Joshi. Per chart review, he was previously taking diazepam.  Staff feel that sleep, behaviors, and mood are well-controlled on this regimen and he has been on this for several years. He participates in a day support program M-F from 7-3.   -Nonverbal: Patient vocalizes slightly but does not verbalize.  He is able to gesture when he is hungry and appears to somewhat understand staff when they are talking to him.    -Requires assistance with ADL: Patient is independent with toileting, staff assist with medications, meals/snacks, and provide bathing assistance. He completes simple chores like straightening room. -High risk for elopement: Patient does not leave the home except for appointments due to high risk for elopement. Per caregiver, he eloped during an attempted PCP visit. No attempts in previous year. Staff ensure locked doors and supervision at all times.    -White coat syndrome: BP significantly elevated at visit t- staff attempted to obtain BP with automatic cuff. Patient became very afraid when NP and RN entered the room and ran from the common area to his room upstairs. BP likely not accurate given activity and significant fear. Staff monitor BP weekly and Bps reviewed with recent readings 117/70, 120/70   -Constipation: Patient is continent and uses restroom independently, but staff report finding very large BM in toilet occasionally. Will increase docusate to twice daily and nurse encouraged increased fiber such as prunes in oatmeal (staff endorse he enjoys this food).       Health Maintenance   Topic Date Due    Hepatitis C Screening  Never done    COVID-19 Vaccine (1) Never done    DTaP/Tdap/Td series (1 - Tdap) Never done    Lipid Screen  Never done    Flu Vaccine (1) Never done    Depression Screen  06/15/2023    Pneumococcal 0-64 years  Aged Out     Last NP visit 7/13/22  Estimated Visit Frequency:  Every 3 month Provider Visit  SW visits PRN

## 2022-10-10 ENCOUNTER — HOME VISIT (OUTPATIENT)
Dept: FAMILY MEDICINE CLINIC | Age: 42
End: 2022-10-10
Payer: OTHER GOVERNMENT

## 2022-10-10 VITALS — RESPIRATION RATE: 20 BRPM | TEMPERATURE: 98.2 F | HEART RATE: 86 BPM | OXYGEN SATURATION: 96 %

## 2022-10-10 DIAGNOSIS — R47.01 NONVERBAL: ICD-10-CM

## 2022-10-10 DIAGNOSIS — F40.232 FEAR OF OTHER MEDICAL CARE: ICD-10-CM

## 2022-10-10 DIAGNOSIS — F84.0 AUTISTIC DISORDER: Primary | ICD-10-CM

## 2022-10-10 DIAGNOSIS — Z74.1 REQUIRES ASSISTANCE WITH ACTIVITIES OF DAILY LIVING (ADL): ICD-10-CM

## 2022-10-10 DIAGNOSIS — Z91.89 AT HIGH RISK FOR ELOPEMENT: ICD-10-CM

## 2022-10-10 DIAGNOSIS — K59.00 CONSTIPATION, UNSPECIFIED CONSTIPATION TYPE: ICD-10-CM

## 2022-10-10 DIAGNOSIS — R03.0 WHITE COAT SYNDROME WITH HIGH BLOOD PRESSURE WITHOUT HYPERTENSION: ICD-10-CM

## 2022-10-10 DIAGNOSIS — F71 MODERATE INTELLECTUAL DISABILITY: ICD-10-CM

## 2022-10-10 PROCEDURE — 99349 HOME/RES VST EST MOD MDM 40: CPT | Performed by: NURSE PRACTITIONER

## 2022-10-10 NOTE — PROGRESS NOTES
Home Based [address] [facility]   [phone]          NOTE: Home Based Primary Care is a PROVIDER (MD/NP) based interdisciplinary practice (RN, LCSW, Pharmacy, Dietician) for patients who cannot access (or have a taxing effort) primary / specialty medical care in an office setting. [facility] is NOT [employer] but works with [facility] when there is a skilled need. Our MD/NPs are integral in Care Transitions; PLEASE CALL [phone] if this patient arrives in the Emergency Department or Hospital.         Date of Current Visit: 10/10/2022     SUMMARY     Leander London is a 42 y.o. male seen in the home today due to taxing effort to seek primary care services in the community s/t autism with non-verbal status and intellectual disabilities. Patient/Family present for Current Visit:  [place]    ASSESSMENT AND PLAN       ICD-10-CM ICD-9-CM    1. Autistic disorder  F84.0 299.00       2. Moderate intellectual disability  F71 318.0       3. Nonverbal  R47.01 784.3       4. Requires assistance with activities of daily living (ADL)  Z74.1 V49.89       5. At high risk for elopement  Z91.89 V49.89       6. Fear of other medical care  F40.232 300.29       7. White coat syndrome with high blood pressure without hypertension  R03.0 796.2       8. Constipation, unspecified constipation type  K59.00 564.00         -Autism/Intellectual disability: Currently living in group home with others who have intellectual diabilities. Per [facility] caregivers, he gets along with other residents, but is extremely fearful of strangers and especially healthcare. He is followed by Dr. Any Mccann, neuropsychiatrist, via virtual visits and currently managed with Risperdal 3mg twice daily, hydroxyzine 25mg twice daily (prescribed by Dr. Mina Bourne).   Per chart review, he was previously taking diazepam.  Staff feel that sleep, behaviors, and mood are well-controlled on this regimen and he has been on this for several years. He participates in a day support program M-F from 7-3 and has been cooperative at this program.   -Nonverbal: Patient vocalizes slightly but does not verbalize. Appears to understand requests and makes small vocalizations in response. He is able to gesture when he is hungry, points to milk during visit. -Requires assistance with ADL: Patient is ambulatory and independent with toileting, staff assist with medications, meals/snacks, and provide bathing assistance. He completes simple chores like straightening room. He is able to gesture for his needs.  -High risk for elopement: Patient does not leave the home except for appointments and day support program due to high risk for elopement. Per chart review, he eloped during an attempted PCP visit and ER visit. Staff ensure locked doors and supervision at all times.    -Constipation: Patient is continent and uses restroom independently and bowels have been more regular with docusate twice daily. Encourage ongoing hydration and fiber intake. -Fear of medical care/White coat syndrome: Patient locked self in bathroom at initial visit and would not allow NP or RN to come near. Previous elopement during outpatient PCP and ER visits. BP and HR significantly elevated at initial visit due to anxious state, but both are WNL with staff monitoring. Allow time to establish relationship, wear plain clothes, provide snacks for visits. Patient was much calmer at visit today, but did retreat to room once snack was done and did not allow BP. Per caregivers he does not allow blood draws or injections, has not recently allowed haircuts.      -Labs: Patient has not had recent labs and patient is unable to cooperate for labwork   -AMD/Code Status: Patient unable to communicate wishes, attempted to call mother and left voicemail.  Will continue to reach out to mother/POA at each visit.   -Follow up: in 6 months (4/17/23)    I have left a SUMMARY / Wilder Pallas from today's visit with the patient/family in the home        HISTORY:      CHIEF COMPLAINT:    Chief Complaint   Patient presents with    Follow-up     Autism        HPI/SUBJECTIVE:    Mr. Iesha Amato is a 43 y/o M seen today for follow-up of medical conditions. He has autism and resides in a group home with other residents with intellectual disabilities. Two [facility] caretakers are present for visit today and assist with encouraging patient to allow exam. Caregiver denies any concerns or acute events since previous visit. He has a good appetite, regular sleeping habits, no bowel or bladder concerns, and behaviors at baseline. He has had no recent falls. He attends a day support program and this helps keep him on a regular schedule. He has not had an elopement attempt in about a year. He is not able to leave the group home except for rare appointments due to his history of elopement. He is fearful of needles and has therefore not recently had vaccinations or labwork. Discussed flu vaccine today, caretakers report he has not allowed scissors to cut his hair and would not allow a needle. He continues to be followed with virtual visits by psychiatrist Dr. Rob Santoyo who prescribes his Risperdal and hydroxyzine. He has had no hospitalizations or ED visits since 2012. At previous visit, NP and RN were not able to accurately obtain vital signs or assess patient as he became fearful and left the room, going into the bathroom and locking the door. At next visit, this provider arrived in plain clothes and provided patient with snacks. He took the snacks provided and allowed vital signs and a brief assessment. Today, he sits in the kitchen and allows HR/SpO2 and temperature, brief exam.  However, when he finishes the snack he leaves the room for his bedroom and will not return for BP.   His HR and BP were elevated at first visit in the setting of fear of new provider, but BP and HR obtained by staff in the group home have been WNL. He provided a finger for SpO2 and was calm for exam, but did not allow BP or take breaths for lung auscultation. Physical exam revealed no concerning findings. Caregivers reported no additional concerns or questions. Attempted to call his mother after visit and left voicemail. REVIEW OF SYSTEMS:     Patient unable to complete ROS due to nonverbal status and leaving room  Caregivers deny skin breakdown, changes to appetite, difficulty with medications, coughing, nausea/vomiting, appearance of pain, fevers, weight changes, changes to bowel/bladder habits       PHYSICAL EXAM:     Physical Exam  Constitutional:       General: He is not in acute distress. Appearance: He is normal weight. He is not ill-appearing. HENT:      Head: Normocephalic and atraumatic. Right Ear: External ear normal.      Left Ear: External ear normal.      Nose: Nose normal. No rhinorrhea. Eyes:      General: No scleral icterus. Right eye: No discharge. Left eye: No discharge. Pupils: Pupils are equal, round, and reactive to light. Neck:      Vascular: No carotid bruit. Cardiovascular:      Rate and Rhythm: Normal rate and regular rhythm. Heart sounds: Normal heart sounds. Pulmonary:      Effort: Pulmonary effort is normal.      Breath sounds: Normal breath sounds. Comments: Lung exam limited as patient did not follow commands for inspiration  Abdominal:      General: Abdomen is flat. Bowel sounds are normal.      Palpations: Abdomen is soft. Musculoskeletal:      Right lower leg: No edema. Left lower leg: No edema. Skin:     General: Skin is warm and dry. Findings: No bruising, erythema or rash. Neurological:      Mental Status: He is alert. Mental status is at baseline.       Comments: Vocalizes but does not verbalize, does not make eye contact, follows simple commands      HISTORY:     Past Medical and Surgical History reviewed in [address] [place] [address] on date of initial visit    Patient Active Problem List   Diagnosis Code    Autistic disorder F84.0    Moderate intellectual disability F71     History reviewed. No pertinent family history. Social History     Tobacco Use    Smoking status: Never    Smokeless tobacco: Never   Substance Use Topics    Alcohol use: Not on file     Allergies   Allergen Reactions    Haldol [Haloperidol Lactate] Unknown (comments)      Current Outpatient Medications   Medication Sig    docusate sodium (COLACE) 100 mg capsule 1 CAPSULE BY MOUTH 2 TIMES A DAY    phenylephrine-dm-guaiFENesin (Tussin CF, PE-DM-guaif,) 5- mg/5 mL liqd Take 10 mL by mouth every six (6) hours as needed. Indications: cold symptoms, cough    risperiDONE (RisperDAL) 3 mg tablet Take 3 mg by mouth two (2) times a day.    hydrOXYzine pamoate (VISTARIL) 25 mg capsule Take 25 mg by mouth two (2) times a day. acetaminophen (TYLENOL) 500 mg tablet Take 2 Tablets by mouth every six (6) hours as needed for Fever or Mild Pain. miconazole (Zeasorb AF) 2 % topical powder Apply  to affected area two (2) times a day. (Patient not taking: Reported on 10/10/2022)     No current facility-administered medications for this visit.         LAB DATA REVIEWED:     No recent lab results            José Vieira NP

## 2022-11-09 ENCOUNTER — DOCUMENTATION ONLY (OUTPATIENT)
Dept: FAMILY MEDICINE CLINIC | Age: 42
End: 2022-11-09

## 2022-11-09 NOTE — PROGRESS NOTES
Home Based Primary Care  Plan of Care    [facility] Team Members: Radha Shields MD; Hortensia Severs, NP;  Haley Prieto NP; Eduard Cisneros RN; Ela Sen, CASPER; Alicia Hartman RN; Yobani Quintana LCSW    Vicki Mirzachela  1980 / [id number]  male    The physician has reviewed and discussed the plan of care with the interdisciplinary group on 11/15/22 and agrees. Date of Initial Visit (Start of Care): 6/15/22    Diagnosis:   Patient Active Problem List   Diagnosis Code    Autistic disorder F84.0    Moderate intellectual disability F71       Patient status summary: 42 y.o. male who was referred to the [address] [place] [address] due to autism with intelectual disability and nonverbal status. Patient discussed today for POC review. Advance Care Planning:  Code status: No Order    DME/Supplies: None      Allergies   Allergen Reactions    Haldol [Haloperidol Lactate] Unknown (comments)       Nutritional Requirements:   Oral with supported meal preparation    Functional/Activity Level:  Ambulatory without assistance    Safety Measures:   Self-care deficity    Acuity Level Rating: Low    Current Outpatient Medications   Medication Sig    docusate sodium (COLACE) 100 mg capsule 1 CAPSULE BY MOUTH 2 TIMES A DAY    miconazole (Zeasorb AF) 2 % topical powder Apply  to affected area two (2) times a day. (Patient not taking: Reported on 10/10/2022)    phenylephrine-dm-guaiFENesin (Tussin CF, PE-DM-guaif,) 5- mg/5 mL liqd Take 10 mL by mouth every six (6) hours as needed. Indications: cold symptoms, cough    risperiDONE (RisperDAL) 3 mg tablet Take 3 mg by mouth two (2) times a day.    hydrOXYzine pamoate (VISTARIL) 25 mg capsule Take 25 mg by mouth two (2) times a day. acetaminophen (TYLENOL) 500 mg tablet Take 2 Tablets by mouth every six (6) hours as needed for Fever or Mild Pain. No current facility-administered medications for this visit.        The Plan of Care has been initiated for during discussion at interdisciplinary group meeting  and reviewed and updated by the Interdisciplinary Group (IDG) as frequently as the patient condition warrants. Plan of Care by Discipline:    1. Provider  Ongoing evaluation of treatment interventions for specific disease state, Create and implement disease /symptom specific plan to manage high risk conditions / symptoms, and Communicate with prior/current health care team members to enhance understanding of patient status    2. Nursing  Communicate with prior/current health care team members to enhance understanding of patient status, Education regarding current medications and adverse effects, Provider caregiver / family support for patient's current and changing condition, and Continued assessment of need for home - based services based on taxing effort to leave the home    3. Social Work  Establish therapeutic relationship through in home visits and telephonic touch points, Assist in optimizing levels of psychosocial function, Assess safety concerns and address as appropriate, and Assess patient for caregiver needs to optimize psychosocial function and safety      Plan of Care Orders / Action Items:    -Autism/Intellectual disability: Currently living in group home with others who have intellectual diabilities. Per [facility] caregivers, he gets along with other residents, but is extremely fearful of strangers and especially healthcare. He is followed by Dr. Benoit Irving, neuropsychiatrist, via virtual visits and currently managed with Risperdal 3mg twice daily, hydroxyzine 25mg twice daily (prescribed by Dr. Esmer Patel). Per chart review, he was previously taking diazepam.  Staff feel that sleep, behaviors, and mood are well-controlled on this regimen and he has been on this for several years. He participates in a day support program M-F from 7-3 and has been cooperative at this program.   -Nonverbal: Patient vocalizes slightly but does not verbalize.  Appears to understand requests and makes small vocalizations in response. He is able to gesture when he is hungry, points to milk during visit. -Requires assistance with ADL: Patient is ambulatory and independent with toileting, staff assist with medications, meals/snacks, and provide bathing assistance. He completes simple chores like straightening room. He is able to gesture for his needs.  -High risk for elopement: Patient does not leave the home except for appointments and day support program due to high risk for elopement. Per chart review, he eloped during an attempted PCP visit and ER visit. Staff ensure locked doors and supervision at all times.    -Constipation: Patient is continent and uses restroom independently and bowels have been more regular with docusate twice daily. Encourage ongoing hydration and fiber intake. -Fear of medical care/White coat syndrome: Patient locked self in bathroom at initial visit and would not allow NP or RN to come near. Previous elopement during outpatient PCP and ER visits. BP and HR significantly elevated at initial visit due to anxious state, but both are WNL with staff monitoring. Allow time to establish relationship, wear plain clothes, provide snacks for visits. Patient was much calmer at 10/10/22 visit, but did retreat to room once snack was done and did not allow BP. Per caregivers he does not allow blood draws or injections, has not recently allowed haircuts.       -Labs: Patient has not had recent labs and patient is unable to cooperate for labwork   -AMD/Code Status: Patient unable to communicate wishes, attempted to call mother and left voicemail.  Will continue to reach out to mother/POA at each visit.   -Follow up: in 6 months (4/17/23)    Health Maintenance   Topic Date Due    Hepatitis C Screening  Never done    COVID-19 Vaccine (1) Never done    DTaP/Tdap/Td series (1 - Tdap) Never done    Lipid Screen  Never done    Flu Vaccine (1) Never done    Depression Screen  06/15/2023 Pneumococcal 0-64 years  Aged Out     Last NP visit 10/10/22  Estimated Visit Frequency:  Other: Every 6 months  SW visits PRN

## 2022-12-28 DIAGNOSIS — K59.00 CONSTIPATION, UNSPECIFIED CONSTIPATION TYPE: ICD-10-CM

## 2022-12-28 RX ORDER — DOCUSATE SODIUM 100 MG/1
CAPSULE, LIQUID FILLED ORAL
Qty: 62 CAPSULE | Refills: 0 | Status: SHIPPED | OUTPATIENT
Start: 2022-12-28

## 2023-01-06 ENCOUNTER — DOCUMENTATION ONLY (OUTPATIENT)
Dept: FAMILY MEDICINE CLINIC | Age: 43
End: 2023-01-06

## 2023-01-06 NOTE — PROGRESS NOTES
Home Based Primary Care  Plan of Care    [facility] Team Members: Eagle Heck MD; Jonas Henriquez NP;  Maria Victoria Barr NP; Marquez Montalvo, RN; Margot Quiroga, RN; Alicia Hartman, CASPER; Fabiola Perla LCSW    Jeannette Maccici  1980 / [id number]  male    The physician has reviewed and discussed the plan of care with the interdisciplinary group on 01/10/23 and agrees. Date of Initial Visit (Start of Care): 6/15/22    Diagnosis:   Patient Active Problem List   Diagnosis Code    Autistic disorder F84.0    Moderate intellectual disability F71       Patient status summary: 36 y.o. male who was referred to the [address] [place] [address] due to autism with intelectual disability and nonverbal status. Patient discussed today for POC review. Advance Care Planning:  Code status: No Order    DME/Supplies: None      Allergies   Allergen Reactions    Haldol [Haloperidol Lactate] Unknown (comments)       Nutritional Requirements:   Oral with supported meal preparation    Functional/Activity Level:  Ambulatory without assistance    Safety Measures:   Self-care deficity    Acuity Level Rating: Low    Current Outpatient Medications   Medication Sig    docusate sodium (COLACE) 100 mg capsule 1 CAPSULE BY MOUTH 2 TIMES A DAY    miconazole (Zeasorb AF) 2 % topical powder Apply  to affected area two (2) times a day. (Patient not taking: Reported on 10/10/2022)    phenylephrine-dm-guaiFENesin (Tussin CF, PE-DM-guaif,) 5- mg/5 mL liqd Take 10 mL by mouth every six (6) hours as needed. Indications: cold symptoms, cough    risperiDONE (RisperDAL) 3 mg tablet Take 3 mg by mouth two (2) times a day.    hydrOXYzine pamoate (VISTARIL) 25 mg capsule Take 25 mg by mouth two (2) times a day. acetaminophen (TYLENOL) 500 mg tablet Take 2 Tablets by mouth every six (6) hours as needed for Fever or Mild Pain. No current facility-administered medications for this visit.        The Plan of Care has been reviewed and updated by the Interdisciplinary Group (IDG) as frequently as the patient condition warrants. Plan of Care by Discipline:    1. Provider  Ongoing evaluation of treatment interventions for specific disease state, Create and implement disease /symptom specific plan to manage high risk conditions / symptoms, and Communicate with prior/current health care team members to enhance understanding of patient status    2. Nursing  Communicate with prior/current health care team members to enhance understanding of patient status, Education regarding current medications and adverse effects, Provider caregiver / family support for patient's current and changing condition, and Continued assessment of need for home - based services based on taxing effort to leave the home    3. Social Work  Establish therapeutic relationship through in home visits and telephonic touch points, Assist in optimizing levels of psychosocial function, Assess safety concerns and address as appropriate, and Assess patient for caregiver needs to optimize psychosocial function and safety      Plan of Care Orders / Action Items:    -Autism/Intellectual disability: Currently living in group home with others who have intellectual diabilities. Per [facility] caregivers, he gets along with other residents, but is extremely fearful of strangers and especially healthcare. He is followed by Dr. Brandy Ashraf, neuropsychiatrist, via virtual visits and currently managed with Risperdal 3mg twice daily, hydroxyzine 25mg twice daily (prescribed by Dr. Trisha Bowman). Per chart review, he was previously taking diazepam.  Staff feel that sleep, behaviors, and mood are well-controlled on this regimen and he has been on this for several years. He participates in a day support program M-F from 7-3 and has been cooperative at this program.   -Nonverbal: Patient vocalizes slightly but does not verbalize. Appears to understand requests and makes small vocalizations in response.   He is able to gesture when he is hungry, points to milk during visit. -Requires assistance with ADL: Patient is ambulatory and independent with toileting, staff assist with medications, meals/snacks, and provide bathing assistance. He completes simple chores like straightening room. He is able to gesture for his needs.  -High risk for elopement: Patient does not leave the home except for appointments and day support program due to high risk for elopement. Per chart review, he eloped during an attempted PCP visit and ER visit. Staff ensure locked doors and supervision at all times.    -Constipation: Patient is continent and uses restroom independently and bowels have been more regular with docusate twice daily. Encourage ongoing hydration and fiber intake. -Fear of medical care/White coat syndrome: Patient locked self in bathroom at initial visit and would not allow NP or RN to come near. Previous elopement during outpatient PCP and ER visits. BP and HR significantly elevated at initial visit due to anxious state, but both are WNL with staff monitoring. Allow time to establish relationship, wear plain clothes, provide snacks for visits. Patient was much calmer at 10/10/22 visit, but did retreat to room once snack was done and did not allow BP. Per caregivers he does not allow blood draws or injections, has not recently allowed haircuts.       -Labs: Patient has not had recent labs and patient is unable to cooperate for labwork   -AMD/Code Status: Patient unable to communicate wishes, attempted to call mother and left voicemail.  Will continue to reach out to mother/POA at each visit.   -Follow up: in 6 months (4/17/23)    Health Maintenance   Topic Date Due    Hepatitis C Screening  Never done    COVID-19 Vaccine (1) Never done    DTaP/Tdap/Td series (1 - Tdap) Never done    Lipid Screen  Never done    Flu Vaccine (1) Never done    Depression Screen  06/15/2023    Pneumococcal 0-64 years  Aged Out     Last NP visit 10/10/22  Estimated Visit Frequency:  Other: Every 6 months  SW visits PRN

## 2023-02-06 DIAGNOSIS — K59.00 CONSTIPATION, UNSPECIFIED CONSTIPATION TYPE: ICD-10-CM

## 2023-02-07 RX ORDER — DOCUSATE SODIUM 100 MG/1
CAPSULE, LIQUID FILLED ORAL
Qty: 60 CAPSULE | Refills: 0 | Status: SHIPPED | OUTPATIENT
Start: 2023-02-07

## 2023-03-01 ENCOUNTER — DOCUMENTATION ONLY (OUTPATIENT)
Dept: FAMILY MEDICINE CLINIC | Age: 43
End: 2023-03-01

## 2023-03-01 NOTE — PROGRESS NOTES
Home Based Primary Care  Plan of Care    [facility] Team Members: Sadie Neal MD; Nakia Peacock NP;  Jose Daniel Perla NP; Manfred Mendoza, RN; Mirella Wolfe, RN; Alicia Hartman RN; BEAR Estrada [place]  1980 / [id number]  male    Date of Initial Visit (Start of Care): 6/15/22    Diagnosis:   Patient Active Problem List   Diagnosis Code    Autistic disorder F84.0    Moderate intellectual disability F71       Patient status summary: 36 y.o. male who was referred to the [address] [place] [address] due to autism with intelectual disability and nonverbal status. Patient discussed today for POC review. Advance Care Planning:  Code status: No Order    DME/Supplies: None      Allergies   Allergen Reactions    Haldol [Haloperidol Lactate] Unknown (comments)       Nutritional Requirements:   Oral with supported meal preparation    Functional/Activity Level:  Ambulatory without assistance    Safety Measures:   Self-care deficity    Acuity Level Rating: Low    Current Outpatient Medications   Medication Sig    docusate sodium (COLACE) 100 mg capsule 1 CAPSULE BY MOUTH 2 TIMES A DAY    miconazole (Zeasorb AF) 2 % topical powder Apply  to affected area two (2) times a day. (Patient not taking: Reported on 10/10/2022)    phenylephrine-dm-guaiFENesin (Tussin CF, PE-DM-guaif,) 5- mg/5 mL liqd Take 10 mL by mouth every six (6) hours as needed. Indications: cold symptoms, cough    risperiDONE (RisperDAL) 3 mg tablet Take 3 mg by mouth two (2) times a day.    hydrOXYzine pamoate (VISTARIL) 25 mg capsule Take 25 mg by mouth two (2) times a day. acetaminophen (TYLENOL) 500 mg tablet Take 2 Tablets by mouth every six (6) hours as needed for Fever or Mild Pain. No current facility-administered medications for this visit. The Plan of Care has been reviewed and updated by the Interdisciplinary Group (IDG) as frequently as the patient condition warrants. Plan of Care by Discipline:    1.  Provider  Ongoing evaluation of treatment interventions for specific disease state, Create and implement disease /symptom specific plan to manage high risk conditions / symptoms, and Communicate with prior/current health care team members to enhance understanding of patient status    2. Nursing  Communicate with prior/current health care team members to enhance understanding of patient status, Education regarding current medications and adverse effects, Provider caregiver / family support for patient's current and changing condition, and Continued assessment of need for home - based services based on taxing effort to leave the home    3. Social Work  Establish therapeutic relationship through in home visits and telephonic touch points, Assist in optimizing levels of psychosocial function, Assess safety concerns and address as appropriate, and Assess patient for caregiver needs to optimize psychosocial function and safety      Plan of Care Orders / Action Items:    -Autism/Intellectual disability: Currently living in group home with others who have intellectual diabilities. Per [facility] caregivers, he gets along with other residents, but is extremely fearful of strangers and especially healthcare. He is followed by Dr. Padmini Carter, neuropsychiatrist, via virtual visits and currently managed with Risperdal 3mg twice daily, hydroxyzine 25mg twice daily (prescribed by Dr. Eri Nolen). Per chart review, he was previously taking diazepam.  Staff feel that sleep, behaviors, and mood are well-controlled on this regimen and he has been on this for several years. He participates in a day support program M-F from 7-3 and has been cooperative at this program.   -Nonverbal: Patient vocalizes slightly but does not verbalize. Appears to understand requests and makes small vocalizations in response. He is able to gesture when he is hungry, points to milk during visit.     -Requires assistance with ADL: Patient is ambulatory and independent with toileting, staff assist with medications, meals/snacks, and provide bathing assistance. He completes simple chores like straightening room. He is able to gesture for his needs.  -High risk for elopement: Patient does not leave the home except for appointments and day support program due to high risk for elopement. Per chart review, he eloped during an attempted PCP visit and ER visit. Staff ensure locked doors and supervision at all times.    -Constipation: Patient is continent and uses restroom independently and bowels have been more regular with docusate twice daily. Encourage ongoing hydration and fiber intake. -Fear of medical care/White coat syndrome: Patient locked self in bathroom at initial visit and would not allow NP or RN to come near. Previous elopement during outpatient PCP and ER visits. BP and HR significantly elevated at initial visit due to anxious state, but both are WNL with staff monitoring. Allow time to establish relationship, wear plain clothes, provide snacks for visits. Patient was much calmer at 10/10/22 visit, but did retreat to room once snack was done and did not allow BP. Per caregivers he does not allow blood draws or injections, has not recently allowed haircuts.       -Labs: Patient has not had recent labs and patient is unable to cooperate for labwork   -AMD/Code Status: Patient unable to communicate wishes, attempted to call mother and left voicemail.  Will continue to reach out to mother/POA at each visit.   -Follow up: in 6 months (4/17/23)    Health Maintenance   Topic Date Due    Hepatitis C Screening  Never done    COVID-19 Vaccine (1) Never done    DTaP/Tdap/Td series (1 - Tdap) Never done    Lipid Screen  Never done    Flu Vaccine (1) Never done    Depression Screen  06/15/2023    Pneumococcal 0-64 years  Aged Out     Last NP visit 10/10/22  Estimated Visit Frequency:  Other: Every 6 months  SW visits PRN

## 2023-03-03 ENCOUNTER — TELEPHONE (OUTPATIENT)
Dept: FAMILY MEDICINE CLINIC | Age: 43
End: 2023-03-03

## 2023-03-03 NOTE — TELEPHONE ENCOUNTER
Bonnielee Meigs is the patients  [facility]). I called him to let him know that the patient's mother, Eileen Iraheta needs to sign a universal consent form. He acknowledged and said that he will be seeing her on Tuesday, 3/21 and Thursday, 3/23/23. Carmen Gale know that I will email the documents to him for signature. He acknowledged and said that he would return them to [facility] after Ms. Herbie Jiménez has signed. Henry Morrison also said that the patient couldn't get labs done, it has been 5-6 years. He said they are usually done during sedated dentistry appointments. Henry Morrison said that Beka Christensen is in the process of arranging the patient's next dental appointment. Radha's address and phone number were updated in the patient's chart today. Henry Morrison said that she does not use email. Primary communication is through text message. He is happy to contact her whenever needed.

## 2023-03-16 NOTE — TELEPHONE ENCOUNTER
Call returned to [employer] at [phone]. He stated that he is meeting with Raul West who plans to arrange dental appointment at [facility], and [facility] Arana (sees Dr. Rosibel Goodman). Previously labs have been done during this time as patient requires sedation dentistry. Requested call if this appointment is scheduled so we can reach out to dentist and attempt to facilitate/arrange this.

## 2023-04-04 RX ORDER — ACETAMINOPHEN 500 MG
TABLET ORAL
Qty: 60 TABLET | Refills: 11 | Status: SHIPPED
Start: 2023-04-04

## 2023-04-04 RX ORDER — DOCUSATE SODIUM 100 MG/1
CAPSULE, LIQUID FILLED ORAL
Qty: 60 CAPSULE | Refills: 11 | Status: SHIPPED
Start: 2023-04-04

## 2023-04-17 ENCOUNTER — HOME VISIT (OUTPATIENT)
Dept: FAMILY MEDICINE CLINIC | Age: 43
End: 2023-04-17
Payer: OTHER GOVERNMENT

## 2023-04-17 VITALS
OXYGEN SATURATION: 98 % | HEART RATE: 86 BPM | RESPIRATION RATE: 18 BRPM | SYSTOLIC BLOOD PRESSURE: 128 MMHG | TEMPERATURE: 97.6 F | WEIGHT: 232 LBS | DIASTOLIC BLOOD PRESSURE: 78 MMHG

## 2023-04-17 DIAGNOSIS — R47.01 NONVERBAL: ICD-10-CM

## 2023-04-17 DIAGNOSIS — F40.232 FEAR OF OTHER MEDICAL CARE: ICD-10-CM

## 2023-04-17 DIAGNOSIS — F71 MODERATE INTELLECTUAL DISABILITY: ICD-10-CM

## 2023-04-17 DIAGNOSIS — F84.0 AUTISTIC DISORDER: Primary | ICD-10-CM

## 2023-04-17 DIAGNOSIS — Z91.89 AT HIGH RISK FOR ELOPEMENT: ICD-10-CM

## 2023-04-17 PROCEDURE — 99349 HOME/RES VST EST MOD MDM 40: CPT | Performed by: NURSE PRACTITIONER

## 2023-04-17 RX ORDER — HYDROXYZINE PAMOATE 50 MG/1
1 CAPSULE ORAL
COMMUNITY
Start: 2023-04-01

## 2023-04-17 NOTE — PROGRESS NOTES
Home Based [address] [facility]   [phone]          NOTE: Home Based Primary Care is a PROVIDER (MD/NP) based interdisciplinary practice (RN, LCSW, Pharmacy, Dietician) for patients who cannot access (or have a taxing effort) primary / specialty medical care in an office setting. [facility] is NOT [employer] but works with [facility] when there is a skilled need. Our MD/NPs are integral in Care Transitions; PLEASE CALL [phone] if this patient arrives in the Emergency Department or Hospital.         Date of Current Visit: 4/17/2023     SUMMARY     Marleen Gavin is a 36 y.o. male seen in the home today due to taxing effort to seek primary care services in the community s/t autism with non-verbal status and intellectual disabilities. Patient/Family present for Current Visit:  Patient, grouphome caregiver    ASSESSMENT AND PLAN       ICD-10-CM ICD-9-CM    1. Autistic disorder  F84.0 299.00       2. Moderate intellectual disability  F71 318.0       3. Nonverbal  R47.01 784.3       4. At high risk for elopement  Z91.89 V49.89       5. Fear of other medical care  A36.662 300.29         -Autism/Intellectual disability: Currently living in group home with others who have intellectual diabilities. New caregiver in home today who expresses no concerns about his behaviors in past 6 months. Per [facility] caregivers, he is cooperative in the home, but is extremely fearful of strangers and especially healthcare. He is followed by Dr. Campbell Hood, neuropsychiatrist, via virtual visits and currently managed with Risperdal 3mg twice daily, hydroxyzine 50mg qhs. Per chart review, he was previously taking diazepam.  Staff feel that sleep, behaviors, and mood are well-controlled on this regimen and he has been on this for several years.   He continues to go to a day support program M-F from 7-3 and has been cooperative at this program.   -Nonverbal: Patient vocalizes slightly but does not verbalize. Appears to understand requests and makes small vocalizations in response. He is able to gesture when he is hungry or in pain. Patient is ambulatory and independent with toileting. Staff assist with medications, meals/snacks, and provide bathing assistance.    -High risk for elopement: Patient does not leave the home except for appointments and day support program due to high risk for elopement. Per chart review, he eloped during an attempted PCP visit and ER visit. Staff ensure locked doors and supervision at all times. Discussed with Farzad Mustafa, , upcoming plans for dental appointment and potential labs (currently arranging with his mother). -Fear of medical care/White coat syndrome: Patient locked self in bathroom at initial visit and would not allow NP or RN to come near. Previous elopement during outpatient PCP and ER visits. BP and HR significantly elevated at initial visit due to anxious state, but both are WNL with staff monitoring. Allow time to establish relationship, wear plain clothes, provide snacks for visits. Patient was calm and cooperative at visit today but did require extra time as he frequently got up and left the room or paced around the room. He did allow physical exam and vital signs today. Per caregivers he does not allow blood draws or injections, has not recently allowed haircuts. Phone number for mobile podiatrist left in home, caregiver thinks he will allow visit as he has allowed foot care in the past.     -Labs: Patient has not had recent labs and patient is unable to cooperate for labwork ( previously arranged through dental)  -AMD/Code Status: Patient unable to communicate wishes, attempted to call mother and left voicemail.  Will continue to reach out to mother/POA at each visit.   -Follow up: in 6 months (10/17/23)    I have left a SUMMARY / INSTRUCTIONS from today's visit with the patient/family in the home          HISTORY: CHIEF COMPLAINT:    Chief Complaint   Patient presents with    Follow Up Chronic Condition    Other     Autism         HPI/SUBJECTIVE:    Mr. Marcel Wynn is a 43 y/o M seen today for follow-up of medical conditions. He has autism and resides in a group home with other residents with intellectual disabilities. Caregivers are present during visit today and assist with HPI as patient is nonverbal.  Caregiver denies any concerns or acute events since previous visit. He has a good appetite, regular sleeping habits, no bowel or bladder concerns, and behaviors at baseline. Caregivers endorse they have to portion his meals and snacks as he will eat a very large amount and quickly gain weight. He has had no recent falls or injuries. He attends a day support program and this helps keep him on a regular schedule. He has not had an elopement attempt in about 1.5 years. Other than his day support group, he does not leave the group home except for rare appointments due to his history of elopement. He is fearful of needles and has therefore not recently had vaccinations or labwork. He continues to be followed with virtual visits by psychiatrist Dr. Dru Perez who prescribes his Risperdal and hydroxyzine. He has had no hospitalizations or ED visits since 2012. Since last visit, his , Anup Varma, discussed plans for upcoming dental appointment. Previously, patient completed routine /screening bloodwork during his dental appointments which require sedation. He plans to schedule an appointment this year and will call [facility] to facilitate labs at that time. This provider has been unable to contact patient's mother regarding his healthcare wishes, but  has been in contact with both provider and patient's mother. Seen today, Mr. Brijesh Wynn is calm and mostly cooperative throughout exam.  He is seated on the sofa and makes eye contact to name.   He does not verbalize but does make small vocalizations in response to questions. He allows vital signs, weight, and physical exam today (including inspection of feet). He does require additional time for exam as he often gets up and leaves the room or begins to pace during exam. His HR and BP were elevated at first visit in the setting of fear of new provider, but BP and HR obtained by staff in the group home have been WNL. All vitals are WNL today. Physical exam revealed no concerning findings. Caregivers reported no additional concerns or questions. REVIEW OF SYSTEMS:     Patient unable to complete ROS due to nonverbal status and leaving room  Caregivers deny skin breakdown, changes to appetite, difficulty with medications, coughing, nausea/vomiting, appearance of pain, fevers, weight changes, changes to bowel/bladder habits       PHYSICAL EXAM:     Physical Exam  Constitutional:       General: He is not in acute distress. Appearance: He is normal weight. He is not ill-appearing. HENT:      Head: Normocephalic and atraumatic. Right Ear: External ear normal.      Left Ear: External ear normal.      Nose: Nose normal. No rhinorrhea. Eyes:      General: No scleral icterus. Right eye: No discharge. Left eye: No discharge. Pupils: Pupils are equal, round, and reactive to light. Neck:      Vascular: No carotid bruit. Cardiovascular:      Rate and Rhythm: Normal rate and regular rhythm. Heart sounds: Normal heart sounds. Pulmonary:      Effort: Pulmonary effort is normal.      Breath sounds: Normal breath sounds. Comments: Lung exam limited as patient did not follow commands for inspiration  Abdominal:      General: Abdomen is flat. Bowel sounds are normal.      Palpations: Abdomen is soft. Musculoskeletal:      Right lower leg: No edema. Left lower leg: No edema. Skin:     General: Skin is warm and dry. Findings: No bruising, erythema or rash.    Neurological:      Mental Status: He is alert. Mental status is at baseline. Comments: Vocalizes but does not verbalize, does not make eye contact, follows simple commands      HISTORY:     Past Medical and Surgical History reviewed in [facility] on date of initial visit    Patient Active Problem List   Diagnosis Code    Autistic disorder F84.0    Moderate intellectual disability F71     History reviewed. No pertinent family history. Social History     Tobacco Use    Smoking status: Never    Smokeless tobacco: Never   Substance Use Topics    Alcohol use: Not on file     Allergies   Allergen Reactions    Haldol [Haloperidol Lactate] Unknown (comments)      Current Outpatient Medications   Medication Sig    hydrOXYzine pamoate (VISTARIL) 50 mg capsule Take 1 Capsule by mouth nightly. docusate sodium (COLACE) 100 mg capsule TAKE (1) CAPSULE BY MOUTH TWICE DAILY    acetaminophen (TYLENOL) 500 mg tablet TAKE 2 TABLETS BY MOUTH EVERY 6 HOURS AS NEEDED FOR FEVER    miconazole (Zeasorb AF) 2 % topical powder Apply  to affected area two (2) times a day. risperiDONE (RisperDAL) 3 mg tablet Take 1 Tablet by mouth two (2) times a day. phenylephrine-dm-guaiFENesin (Tussin CF, PE-DM-guaif,) 5- mg/5 mL liqd Take 10 mL by mouth every six (6) hours as needed. Indications: cold symptoms, cough (Patient not taking: Reported on 4/17/2023)     No current facility-administered medications for this visit. LAB DATA REVIEWED:     No recent lab results          On this date 4/17/2023  I have spent 40 minutes reviewing previous notes and face to face with the patient and caregivers performing assessment and discussing plan of care with caregivers as well as documenting on the day of the visit. Patient required additional time due to limited ability to participate and frequent walking or leaving the room during assessment.      Jennifer Valerio NP

## 2023-05-05 ENCOUNTER — DOCUMENTATION ONLY (OUTPATIENT)
Dept: FAMILY MEDICINE CLINIC | Age: 43
End: 2023-05-05

## 2023-05-22 RX ORDER — HYDROXYZINE PAMOATE 50 MG/1
1 CAPSULE ORAL NIGHTLY
COMMUNITY
Start: 2023-04-01

## 2023-05-22 RX ORDER — MICONAZOLE NITRATE 20 MG/G
POWDER TOPICAL 2 TIMES DAILY
COMMUNITY
Start: 2022-08-05

## 2023-05-22 RX ORDER — PSEUDOEPHEDRINE HCL 30 MG
TABLET ORAL
COMMUNITY
Start: 2023-04-04

## 2023-05-22 RX ORDER — ACETAMINOPHEN 500 MG
TABLET ORAL
COMMUNITY
Start: 2023-04-04

## 2023-05-22 RX ORDER — RISPERIDONE 3 MG/1
3 TABLET ORAL 2 TIMES DAILY
COMMUNITY

## 2023-06-12 ENCOUNTER — TELEPHONE (OUTPATIENT)
Facility: CLINIC | Age: 43
End: 2023-06-12

## 2023-06-12 NOTE — TELEPHONE ENCOUNTER
Per April,  I called back [facility]) and left a VM to please call [facility] about the patients next in home visit with Koffi York which is scheduled for 10/17/2023. Detail Level: Zone

## 2023-06-12 NOTE — TELEPHONE ENCOUNTER
Indu Long called from [facility] to schedule an appt for a physical.  Her callback is [phone]

## 2023-06-28 ENCOUNTER — TELEPHONE (OUTPATIENT)
Facility: CLINIC | Age: 43
End: 2023-06-28

## 2023-08-03 ENCOUNTER — TELEPHONE (OUTPATIENT)
Facility: CLINIC | Age: 43
End: 2023-08-03

## 2023-08-03 NOTE — TELEPHONE ENCOUNTER
I called [facility] to reschedule the patient's next in home visit with Hiren Begun from 10/17/23 to Friday, 10/20/23, 12-4pm arrival.  Adriana Ritter confirmed the appointment change. I let her know that I would give her a reminder call closer to the appointment. She acknowledged.

## 2023-08-09 ENCOUNTER — TELEPHONE (OUTPATIENT)
Facility: CLINIC | Age: 43
End: 2023-08-09

## 2023-08-09 NOTE — TELEPHONE ENCOUNTER
[facility] at [facility]   (formerly [place])   Phone:  [phone]      Fax:  [phone] [facility], [facility], [facility] Alissa Hardwick    Name:  Alma Osorio  YOB: 1980    Incoming call from aide (Ms. Asif Sánchez, [phone]) at group home who states Edward Cuba needs a visit due to a cough, congestion, and decreased appetite. She says he just \"doesn't seem to be feeling well. \"  She has not checked his temperature. This nurse informed her that she will pass this along to PCP Hiram Cowden. Nurse is not sure when Primary Care at Home can schedule a sick visit, we will call her back. She states that she thought she was calling [facility] and that she would like for patient to be seen today so she is going to call [facility].      Sara Guzman RN, Gerontological Nursing-BC, [facility]  Referral Navigator, Home Based Primary Care & Supportive Services

## 2023-08-18 ENCOUNTER — CLINICAL DOCUMENTATION (OUTPATIENT)
Facility: CLINIC | Age: 43
End: 2023-08-18

## 2023-08-18 NOTE — PROGRESS NOTES
Progress Notes by Jenni FELICIANO RN at 05/05/23 1815                    Author: Mary Dumont RN  Service: --  Author Type: Registered Nurse       Filed: 05/05/23 1115  Encounter Date: 5/5/2023  Status: [address] Team Members: Raj Renee MD; Donnell Trujillo NP;  Wood Easton NP; Irma Villasenor, RN; Yahir Raymundo, RN; Katty Wang RN; DANYELLE [place]   1980 / [id number]   male      Date of Initial Visit (Start of Care): 6/15/22      Patient status summary: 36 y.o. male who was referred to the [facility] program due to autism with intelectual disability and nonverbal status. Patient discussed today for POC review. Advance Care Planning:   Code status: No Order      DME/Supplies: None        Nutritional Requirements:    Oral with supported meal preparation      Functional/Activity Level:   Ambulatory without assistance      Safety Measures:    Self-care deficity      Acuity Level Rating: Low      The Plan of Care has been reviewed and updated by the Interdisciplinary Group (IDG) as frequently as the patient condition warrants. Plan of Care by Discipline:      1. Provider   Ongoing evaluation of treatment interventions for specific disease state, Create and implement disease /symptom specific plan to manage high risk conditions / symptoms, and Communicate with prior/current  health care team members to enhance understanding of patient status      2. Nursing   Communicate with prior/current health care team members to enhance understanding of patient status, Education regarding current medications and adverse effects, Provider caregiver / family support  for patient's current and changing condition, and Continued assessment of need for home - based services based on taxing effort to leave the home      3.  Social Work   Establish therapeutic relationship through in home visits and telephonic touch

## 2023-09-08 ENCOUNTER — TELEPHONE (OUTPATIENT)
Facility: CLINIC | Age: 43
End: 2023-09-08

## 2023-09-08 NOTE — TELEPHONE ENCOUNTER
Telephone call to caregiver [facility] in patient's group home to change appt for provider to see pt earlier in order to get pre-op physical done prior to patient's scheduled oral surgery on 10/16. Offered 9/29 in the afternoon. Appt change accepted. Will cancel the 10/17 previously scheduled appt.

## 2023-09-08 NOTE — TELEPHONE ENCOUNTER
Lynda Santiago called to update Akhil Nielsen NP that the patient will be having dental surgery on Monday, 10/16/23 with Dr. Unique Mercer. He is located at [facility]. Contact number is [phone]. She said he will be completely sedated. Noris Cuadra asked if a lab order can be written by Johnny Taylor for a CBC and any other pertinent labs. Her callback is [phone]. She said to please leave a message if she is not able to answer. Her phone only rings to her contacts.

## 2023-09-08 NOTE — TELEPHONE ENCOUNTER
Telephone call to mother Mita Hoffman to advise that this nurse spoke with Dr. Kate Sanchez office and then with preadmission nurse [facility] and we have a plan to have his labs done once he is under anesthesia. Jennifer Constantino plans to be with him on day of surgery. Reviewed allergies, he is allergic to Haldol - he stares and has extrapyramidal symptoms with this med - no other know allergies. Discussed that provider is ordering CBC, CMP, TSH and Lipid panel. Jennifer Constantino states that psychiatrist Dr. Giacomo Soares is requesting that an A1c be obtained as risperidone that he prescribes can cause elevation of blood sugar. Jennifer Constantino requests that our provider order A1c and share those results with Dr. Viridiana Burns also states pt has strong family history of diabetes. Per discussion with Jennifer Constantino, we will plan to fax H and P and the lab order to [facility] at [phone] and e-mail to Jennifer Constantino at [email or web address]. She will have the forms with her when he presents to [facility] for oral surgery. She is hoping to be able to cut his hair while he is still asleep as well. She states that Dr. Heidi Hubbard plans to give patient ativan for the morning of surgery.

## 2023-09-08 NOTE — TELEPHONE ENCOUNTER
Telephone call to [place] with dental office of Dr. León Monroe. Discussed that pt's mother has requested that we arrange for labs to be drawn while patient is under general anesthesia for a dental procedure. [place] reports that pt is having this dental procedure done in the OR at [facility].  She provided this nurse with phone number and contact name for preadmission testing. Phone [phone] - spoke with Paris Marino, discussed pt's autism and need for labs. She advised that they are able to do lab draw while pt is under anesthesia as long as we send an order with the patient. Advised that as pt lives in a group home that this may be difficult and asked if we can fax the lab order along with the history and physical form that our provider will complete within 30 days of 10/16/23 surgery date. She provided fax number for this [phone]. We discussed format for order, she states a [facility] lab slip will be fine.

## 2023-09-12 DIAGNOSIS — Z00.00 GENERAL MEDICAL EXAMINATION: Primary | ICD-10-CM

## 2023-09-12 DIAGNOSIS — Z79.899 LONG TERM CURRENT USE OF ANTIPSYCHOTIC MEDICATION: ICD-10-CM

## 2023-09-12 NOTE — PROGRESS NOTES
Labs anticipated with sedation dentistry- added routine screening labs to be completed during that visit

## 2023-09-15 ENCOUNTER — SOCIAL WORK (OUTPATIENT)
Facility: CLINIC | Age: 43
End: 2023-09-15

## 2023-09-15 SDOH — ECONOMIC STABILITY: HOUSING INSECURITY: IN THE LAST 12 MONTHS, HOW MANY PLACES HAVE YOU LIVED?: 1

## 2023-09-15 SDOH — HEALTH STABILITY: PHYSICAL HEALTH: ON AVERAGE, HOW MANY DAYS PER WEEK DO YOU ENGAGE IN MODERATE TO STRENUOUS EXERCISE (LIKE A BRISK WALK)?: 0 DAYS

## 2023-09-15 SDOH — ECONOMIC STABILITY: FOOD INSECURITY: WITHIN THE PAST 12 MONTHS, YOU WORRIED THAT YOUR FOOD WOULD RUN OUT BEFORE YOU GOT MONEY TO BUY MORE.: NEVER TRUE

## 2023-09-15 SDOH — ECONOMIC STABILITY: TRANSPORTATION INSECURITY
IN THE PAST 12 MONTHS, HAS THE LACK OF TRANSPORTATION KEPT YOU FROM MEDICAL APPOINTMENTS OR FROM GETTING MEDICATIONS?: NO

## 2023-09-15 SDOH — ECONOMIC STABILITY: FOOD INSECURITY: WITHIN THE PAST 12 MONTHS, THE FOOD YOU BOUGHT JUST DIDN'T LAST AND YOU DIDN'T HAVE MONEY TO GET MORE.: NEVER TRUE

## 2023-09-15 SDOH — HEALTH STABILITY: PHYSICAL HEALTH: ON AVERAGE, HOW MANY MINUTES DO YOU ENGAGE IN EXERCISE AT THIS LEVEL?: 0 MIN

## 2023-09-15 SDOH — ECONOMIC STABILITY: INCOME INSECURITY: IN THE LAST 12 MONTHS, WAS THERE A TIME WHEN YOU WERE NOT ABLE TO PAY THE MORTGAGE OR RENT ON TIME?: NO

## 2023-09-15 SDOH — ECONOMIC STABILITY: TRANSPORTATION INSECURITY
IN THE PAST 12 MONTHS, HAS LACK OF TRANSPORTATION KEPT YOU FROM MEETINGS, WORK, OR FROM GETTING THINGS NEEDED FOR DAILY LIVING?: NO

## 2023-09-15 SDOH — ECONOMIC STABILITY: INCOME INSECURITY: HOW HARD IS IT FOR YOU TO PAY FOR THE VERY BASICS LIKE FOOD, HOUSING, MEDICAL CARE, AND HEATING?: NOT HARD AT ALL

## 2023-09-15 SDOH — ECONOMIC STABILITY: HOUSING INSECURITY
IN THE LAST 12 MONTHS, WAS THERE A TIME WHEN YOU DID NOT HAVE A STEADY PLACE TO SLEEP OR SLEPT IN A SHELTER (INCLUDING NOW)?: NO

## 2023-09-28 ENCOUNTER — TELEPHONE (OUTPATIENT)
Facility: CLINIC | Age: 43
End: 2023-09-28

## 2023-09-28 NOTE — TELEPHONE ENCOUNTER
I called Son Alfaro to remind of the patient's in home visit w/ Koffi York NP on Friday, 9/29/23, arrival between 12-4pm.  I reached Balbina's  and left a message to please call the [address] [place] [address] office to confirm the visit. Her  message was generic and I could not leave the above details.

## 2023-09-29 ENCOUNTER — OFFICE VISIT (OUTPATIENT)
Facility: CLINIC | Age: 43
End: 2023-09-29

## 2023-09-29 VITALS
BODY MASS INDEX: 31.44 KG/M2 | TEMPERATURE: 98.9 F | OXYGEN SATURATION: 95 % | HEART RATE: 95 BPM | HEIGHT: 74 IN | RESPIRATION RATE: 18 BRPM | WEIGHT: 245 LBS

## 2023-09-29 DIAGNOSIS — F84.0 AUTISTIC DISORDER: ICD-10-CM

## 2023-09-29 DIAGNOSIS — R03.0 ELEVATED BLOOD PRESSURE READING: ICD-10-CM

## 2023-09-29 DIAGNOSIS — Z01.818 ENCOUNTER FOR OTHER PREPROCEDURAL EXAMINATION: Primary | ICD-10-CM

## 2023-09-29 DIAGNOSIS — Z91.89 AT HIGH RISK FOR ELOPEMENT: ICD-10-CM

## 2023-09-29 DIAGNOSIS — R47.01 NONVERBAL: ICD-10-CM

## 2023-09-29 DIAGNOSIS — F40.232 FEAR OF OTHER MEDICAL CARE: ICD-10-CM

## 2023-09-29 NOTE — ASSESSMENT & PLAN NOTE
Patient locked self in bathroom at initial visit and would not allow NP or RN to come near. Attempted to elope at beginning of visit today. Previous elopement during outpatient PCP and ER visits. BP and HR significantly elevated at initial visit due to anxious state, but both are WNL with staff monitoring. Requires additional time for exam, will wear plain clothes to visit and provide snacks. Per caregivers he does not allow blood draws or injections,  has not recently allowed haircuts or toenail care.

## 2023-09-29 NOTE — ASSESSMENT & PLAN NOTE
Currently living in group home with others who have intellectual diabilities. No concerning behaviors in past 6 months. Followed by Dr. Winston Lara, neuropsychiatrist,  via virtual visits and currently managed with Risperdal 3mg twice daily, hydroxyzine 50mg qhs. Per chart review, he was previously taking diazepam.  Staff feel that sleep, behaviors, and mood are well-controlled on this regimen and he has been on this for several years. Patient is ambulatory and independent with toileting. Staff assist with medications, meals/snacks, and provide bathing assistance. He continues to go to a day support program M-F from 7-3.

## 2023-09-29 NOTE — ASSESSMENT & PLAN NOTE
Patient vocalizes slightly but does not verbalize. Appears to understand requests and makes small vocalizations in response. He is able to gesture for requests like food/drink or express pain.

## 2023-10-03 DIAGNOSIS — Z13.220 SCREENING FOR LIPID DISORDERS: ICD-10-CM

## 2023-10-03 DIAGNOSIS — Z76.89 PERSONS ENCOUNTERING HEALTH SERVICES IN OTHER SPECIFIED CIRCUMSTANCES: Primary | ICD-10-CM

## 2023-10-03 DIAGNOSIS — Z13.1 SCREENING FOR DIABETES MELLITUS: ICD-10-CM

## 2023-10-03 DIAGNOSIS — Z13.29 SCREENING FOR THYROID DISORDER: ICD-10-CM

## 2023-10-09 ENCOUNTER — TELEPHONE (OUTPATIENT)
Facility: CLINIC | Age: 43
End: 2023-10-09

## 2023-10-09 NOTE — TELEPHONE ENCOUNTER
Telephone call from [place] with PreAdmission Testing with [facility].  She is wondering if we can provide info on patient's functional status. Advised that this nurse knows pt is ambulatory, controls his bowels and bladder, needs assist with ADLs, is able to feed self. Provided Mom's phone number for questions of past medical history. She is able to see the [facility] lab slip in the computer that this nurse faxed to them last week. Advised that group residential home would also be able to answer functional status questions. Encouraged her to call our office for any additional questions/concerns. She did state that his procedure is now scheduled for Friday 10/13.

## 2023-10-09 NOTE — TELEPHONE ENCOUNTER
Alberto Lewis is the patient's parent. She called to request a full panel of lab work for the patient while his is under anesthesia during his upcoming procedure. She said that he is on a psychotropic medication that elevates his blood sugar. Brooke Lopez also said that the hospital, doctor and herself have all received copies of the patient's pre-op paperwork.     Tasha's callback is [phone]

## 2023-10-09 NOTE — TELEPHONE ENCOUNTER
Telephone call returned to [facility], no answer, left VM to advise that this nurse sent preop forms and lab orders to Dr. Tyler Solano office, to preop testing fax and to [facility] email address as we discussed in a telephone call a month ago.

## 2023-10-09 NOTE — TELEPHONE ENCOUNTER
Yashira Rosen called to check the status of the patient's pre-op physical forms. She stated that [facility] had not received the forms. Kwabena Forrester know that the pre-op physical forms were filled out by Palomo Wood NP and faxed to Dr. Alejandro Gimenez office on 10/4/2023. Juli Bonner acknowledged and said that she would contact [facility] and let her know. Juli Bonner also said that she will be in [place] 10/13-10/16. She said that if there are any forms or paperwork for her to leave them at the patient's group home. I also let her know that the patient's next appointment with the NP is on Wednesday, 10/18.

## 2023-10-10 ENCOUNTER — TELEPHONE (OUTPATIENT)
Facility: CLINIC | Age: 43
End: 2023-10-10

## 2023-10-10 NOTE — TELEPHONE ENCOUNTER
Daya with [facility] is calling to go over meds/health issues prior to procedure scheduled for 10/13. CB # [id number].  Any nurse will be able to assist.

## 2023-10-10 NOTE — TELEPHONE ENCOUNTER
Returned call to [facility] with [facility] Preadmission testing. Answered questions regarding patient's functional status.

## 2023-10-10 NOTE — TELEPHONE ENCOUNTER
Telephone call returned to patient's mother Bret Guzmán to advise that the lab orders are the last two pages of the document that this nurse emailed to her. Bret Guzmán states she had difficulty opening the document so did not know this. She reports that pt's surgical appt was changed from Monday to Friday and she was unable to get an earlier flight so will not arrive on Friday until after his surgery. We discussed that the [id number] PreAdmission testing folks have the lab order and this nurse has spoken with them twice and even provided Tasha's contact number for questions they had about patient's medical history.

## 2023-10-11 ENCOUNTER — TELEPHONE (OUTPATIENT)
Facility: CLINIC | Age: 43
End: 2023-10-11

## 2023-10-11 NOTE — TELEPHONE ENCOUNTER
Telephone call to mother Levi Willson, discussed that this nurse took several calls from [facility] Pre Admission testing on yesterday re: need for additional information and need to reach mother. Levi Willson reports that she spoke with a nurse to provide history last evening. Levi Willson reports she has been able to get a flight to [facility] on Thu evening so she will be present for patient's surgery on Friday morning.

## 2023-10-12 ENCOUNTER — TELEPHONE (OUTPATIENT)
Facility: CLINIC | Age: 43
End: 2023-10-12

## 2023-10-12 NOTE — TELEPHONE ENCOUNTER
I called patient to remind of their in home visit w/ Shaila Horton on 10/18, arrival between 12-4. I reached Home VM and left a message to please call the [address] [place] [address] office to confirm the visit.

## 2023-10-13 ENCOUNTER — TELEPHONE (OUTPATIENT)
Facility: CLINIC | Age: 43
End: 2023-10-13

## 2023-10-13 NOTE — TELEPHONE ENCOUNTER
Telephone call to [address] nurse with [facility]. They were able to find pt and mother, they had driven around hospital parking lot trying to convince pt to get out of the car. They even had dentist Dr. Noah Cisneros go out and try to get him to come into the building. They were not successful and the procedure was canceled. Moni Torres is unsure about rescheduling details.

## 2023-10-13 NOTE — TELEPHONE ENCOUNTER
Angie Rob with [facility] states patient came in for procedure. They went outside. When they went to check on them they were gone. She has no way of getting in contact with the caretaker that was with him. She called mom and it went straight to voicemail.  Her cb # is [phone]

## 2023-10-24 ENCOUNTER — CLINICAL DOCUMENTATION (OUTPATIENT)
Facility: CLINIC | Age: 43
End: 2023-10-24

## 2023-10-24 NOTE — PROGRESS NOTES
patient's changing condition. Plan of Care Orders / Action Items:  Refer to most recent provider visit note for specifics re: plan of care.   No recent changes to plan of care      Estimated Visit Frequency:  Every 3 - 4 months  SW visits PRN

## 2023-10-29 PROBLEM — Z01.818 ENCOUNTER FOR OTHER PREPROCEDURAL EXAMINATION: Status: RESOLVED | Noted: 2023-09-29 | Resolved: 2023-10-29

## 2023-11-21 ENCOUNTER — CLINICAL DOCUMENTATION (OUTPATIENT)
Facility: CLINIC | Age: 43
End: 2023-11-21

## 2023-11-21 NOTE — PROGRESS NOTES
[address] [facility] at [address] [address] [facility], [address] [facility], [address] [place] [address] Team Members: Liss Houser MD; Iftikhar Venegas NP; Ml Vargas NP; Ayad Haddad, YORDY; Kira Mclain, YORDY; Katty Wang RN; Riana Murcia LCSW    Daniela Fam  1980 / [id number]  male    Date of Initial Visit (Start of Care): 6/15/22    Diagnoses  Patient Active Problem List   Diagnosis    Autistic disorder    Moderate intellectual disability    Non-verbally indicates understanding    Elevated blood pressure reading    Nonverbal    Fear of other medical care    At high risk for elopement       Advance Care Planning:    Code Status: Full Code           11/21/2023    10:06 AM   Demographics   Marital Status Single       DME/Supplies:  None- living in [facility] with 24/7 support      Allergies   Allergen Reactions    Haloperidol Lactate      Other reaction(s): Unknown (comments)       Nutritional Requirements:   Oral with supported meal preparation    Functional/Activity Level:  Ambulatory without assistance    Safety Measures:   Fall risk and Self-care deficit    Acuity Level Rating: Medium    Current Outpatient Medications   Medication Sig    UNABLE TO FIND Please obtain the following labs:  CBC with Diff Z 00.00,   CMP Z 00.00,   TSH Z 00.00, Z 13.29,   Lipid Panel Z 13.220, Z 00.00,  Hgb A1c Z51.81, R 63.5    acetaminophen (TYLENOL) 500 MG tablet TAKE 2 TABLETS BY MOUTH EVERY 6 HOURS AS NEEDED FOR FEVER    docusate (COLACE, DULCOLAX) 100 MG CAPS TAKE (1) CAPSULE BY MOUTH TWICE DAILY    hydrOXYzine pamoate (VISTARIL) 50 MG capsule Take 1 capsule by mouth nightly    risperiDONE (RISPERDAL) 3 MG tablet Take 1 tablet by mouth 2 times daily     No current facility-administered medications for this visit. PLAN OF CARE    The Plan of Care is initiated within 15 days of the initial provider visit and updated at least every 60 days or sooner, based on patient's changing condition.   Plan of Care

## 2023-12-04 ENCOUNTER — TELEPHONE (OUTPATIENT)
Facility: CLINIC | Age: 43
End: 2023-12-04

## 2023-12-04 NOTE — TELEPHONE ENCOUNTER
Dental Procedure - Fabienne Manriquez of [facility] called to request pre procedure paperwork completion and physical for the patient.  She states that the patient is scheduled for a dental procedure on 12/18/23 with Dr. Cardenas at [facility].     Forms - Fabienne said that she will fax forms to [facility] today at [phone].     Fabienne Manriquez # - [phone]

## 2023-12-06 ENCOUNTER — TELEPHONE (OUTPATIENT)
Facility: CLINIC | Age: 43
End: 2023-12-06

## 2023-12-06 NOTE — TELEPHONE ENCOUNTER
I called patient to remind of their in home visit w/ Darius Greenfield on 12/12/23, arrival between 12-4. I reached patient VM and left a message to please call the [address] [place] [address] office to confirm the visit.

## 2023-12-12 ENCOUNTER — OFFICE VISIT (OUTPATIENT)
Facility: CLINIC | Age: 43
End: 2023-12-12
Payer: COMMERCIAL

## 2023-12-12 VITALS
DIASTOLIC BLOOD PRESSURE: 72 MMHG | HEART RATE: 98 BPM | WEIGHT: 251 LBS | RESPIRATION RATE: 18 BRPM | TEMPERATURE: 97.8 F | SYSTOLIC BLOOD PRESSURE: 136 MMHG | OXYGEN SATURATION: 95 % | BODY MASS INDEX: 32.23 KG/M2

## 2023-12-12 DIAGNOSIS — F71 MODERATE INTELLECTUAL DISABILITY: ICD-10-CM

## 2023-12-12 DIAGNOSIS — F40.232 FEAR OF OTHER MEDICAL CARE: ICD-10-CM

## 2023-12-12 DIAGNOSIS — Z01.818 PRE-PROCEDURAL EXAMINATION: Primary | ICD-10-CM

## 2023-12-12 DIAGNOSIS — R47.01 NONVERBAL: ICD-10-CM

## 2023-12-12 DIAGNOSIS — F84.0 AUTISTIC DISORDER: ICD-10-CM

## 2023-12-12 DIAGNOSIS — Z91.89 AT HIGH RISK FOR ELOPEMENT: ICD-10-CM

## 2023-12-12 PROCEDURE — 99349 HOME/RES VST EST MOD MDM 40: CPT | Performed by: NURSE PRACTITIONER

## 2023-12-12 NOTE — ASSESSMENT & PLAN NOTE
Patient vocalizes slightly but does not verbalize. Appears to understand requests, follows commands, and makes small vocalizations in response. He is able to gesture for requests like food/drink or express pain.   Caregiver Vilma nunes HPI

## 2023-12-12 NOTE — ASSESSMENT & PLAN NOTE
Currently living in group home with others who have intellectual diabilities. Caregiver Gustavo Alvares reports no new behaviors, he likes routine and gets along well with others. Followed by Dr. Yonathan Gottlieb, neuropsychiatrist,  via virtual visits and currently managed with Risperdal 3mg twice daily, hydroxyzine 50mg qhs. Per chart review, he was previously taking diazepam.  Staff feel that sleep, behaviors, and mood are well-controlled on this regimen and he has been on this for several years. Patient is ambulatory and independent with toileting. Staff assist with medications, meals/snacks, and provide bathing assistance. He continues to go to a day support program M-F from 7-3.

## 2023-12-12 NOTE — ASSESSMENT & PLAN NOTE
Patient is generally calm as he seems to recognize this provider (had recent visit). Attempted to elope at last visit but no attempt today. Previous elopement during outpatient PCP and ER visits. Requires additional time for exam, will wear plain clothes to visit and provide snacks. Per caregivers he does not allow blood draws or injections,  has not recently allowed haircuts or toenail care. Will require sedation for dentistry.

## 2023-12-12 NOTE — PROGRESS NOTES
[address] [facility]   [phone]         Date of Current Visit: 12/12/2023        SUMMARY       Feliz Arenas is a 36 y.o.  male seen in the home today due to taxing effort to seek primary care services in the community s/t autism with non-verbal status and intellectual disabilities. Patient/Family present for Current Visit:  Patient, staceye caregiver [facility]        ASSESSMENT AND PLAN       1. Pre-procedural examination    2. Autistic disorder    3. Moderate intellectual disability    4. Nonverbal    5. Fear of other medical care    6. At high risk for elopement       1. Pre-procedural examination   Completed today- no concerns identified. Will fax pre-procedural clearance to dental office (completed today). Dental cleaning is scheduled 12/18/23  2. Autistic disorder/ 3. Moderate intellectual disability  Currently living in group home with others who have intellectual diabilities. Caregiver [facility] reports no new behaviors, he likes routine and gets along well with others. Followed by Dr. Shawn Armas, neuropsychiatrist,  via virtual visits and currently managed with Risperdal 3mg twice daily, hydroxyzine 50mg qhs. Per chart review, he was previously taking diazepam.  Staff feel that sleep, behaviors, and mood are well-controlled on this regimen and he has been on this for several years. Patient is ambulatory and independent with toileting. Staff assist with medications, meals/snacks, and provide bathing assistance. He continues to go to a day support program M-F from 7-3.    4. Nonverbal  Patient vocalizes slightly but does not verbalize. Appears to understand requests, follows commands, and makes small vocalizations in response. He is able to gesture for requests like food/drink or express pain. Caregiver [facility] supplements HPI  5. Fear of other medical care/ 6. At high risk for elopement    Patient is generally calm as he seems to recognize this provider (had recent visit).

## 2023-12-29 ENCOUNTER — TELEPHONE (OUTPATIENT)
Age: 43
End: 2023-12-29

## 2023-12-29 NOTE — TELEPHONE ENCOUNTER
Najda brody wanted to schedule an appointment for Noris to visit the patient to evaluate the patient's athlete foot. # [phone]

## 2024-01-03 ENCOUNTER — TELEPHONE (OUTPATIENT)
Facility: CLINIC | Age: 44
End: 2024-01-03

## 2024-01-03 NOTE — TELEPHONE ENCOUNTER
Called patient to confirm their in home visit with michael esquivel on 1/9/2024, arrival between 12-4.  Spoke with Fabienne, they confirmed the appointment and answered the covid screen questions. Does anyone in the household have covid no  Have there been any changes to insurance no or location no

## 2024-01-08 ENCOUNTER — OFFICE VISIT (OUTPATIENT)
Facility: CLINIC | Age: 44
End: 2024-01-08
Payer: COMMERCIAL

## 2024-01-08 VITALS — RESPIRATION RATE: 18 BRPM | OXYGEN SATURATION: 96 % | HEART RATE: 94 BPM | TEMPERATURE: 97.3 F

## 2024-01-08 DIAGNOSIS — R47.01 NONVERBAL: ICD-10-CM

## 2024-01-08 DIAGNOSIS — F71 MODERATE INTELLECTUAL DISABILITY: ICD-10-CM

## 2024-01-08 DIAGNOSIS — B35.3 TINEA PEDIS OF RIGHT FOOT: Primary | ICD-10-CM

## 2024-01-08 DIAGNOSIS — F84.0 AUTISTIC DISORDER: ICD-10-CM

## 2024-01-08 DIAGNOSIS — F40.232 FEAR OF OTHER MEDICAL CARE: ICD-10-CM

## 2024-01-08 DIAGNOSIS — Z91.89 AT HIGH RISK FOR ELOPEMENT: ICD-10-CM

## 2024-01-08 PROCEDURE — 99348 HOME/RES VST EST LOW MDM 30: CPT | Performed by: NURSE PRACTITIONER

## 2024-01-08 NOTE — PROGRESS NOTES
[facility]   [phone]         Date of Current Visit: 1/8/2024        SUMMARY       Jonathon Patricia is a 42 y.o.  male seen in the home today due to taxing effort to seek primary care services in the community s/t autism with non-verbal status and intellectual disabilities.       Patient/Family present for Current Visit:  Patient, grouphome caregiver Nadja        ASSESSMENT AND PLAN       1. Tinea pedis of right foot    2. Autistic disorder    3. Moderate intellectual disability    4. Nonverbal    5. Fear of other medical care    6. At high risk for elopement       1. Tinea pedis of right foot      -primarily between toes   -onset about 2 weeks ago   -patient unable to endorse any pain or pruritis    -continue miconazole spray twice daily for total of 4 weeks or at least 1 week after rash clears   -wear cotton socks, breathable shoes if able and rotate shoes    -call if worsening or new symptoms, open areas   2. Autistic disorder/ 3. Moderate intellectual disability/ 4. Nonverbal  Assessment & Plan:  Currently living in group home with others who have intellectual diabilities.  Caregiver Nadja reports no new behaviors, some recent improvement in allowing care, seems less fearful. Followed by Dr. Danii Martinez, neuropsychiatrist,  via virtual visits and currently managed with Risperdal 3mg twice daily, hydroxyzine 50mg qhs. Per chart review, he was previously taking diazepam.  Staff feel that sleep, behaviors, and mood are well-controlled on this regimen and he has been on this for several years.  He continues to go to a day support program M-F from 7-3.  Outpatient appointment for sedation dentistry attempted x 2 but mother has been unable to get patient in the office due to his fear  5. Fear of other medical care/ 6. At high risk for elopement  Assessment & Plan:  Patient calm during visit today, previously locked self in bathroom at initial visit and would not allow NP or RN to come

## 2024-01-08 NOTE — ASSESSMENT & PLAN NOTE
Patient calm during visit today, previously locked self in bathroom at initial visit and would not allow NP or RN to come near and attempted to elope at our last visit.  Previous elopement during outpatient PCP and ER visits.  Only intermittently allows vitals, did not allow BP today but was calm for general assessment.  Requires additional time for exam, will wear plain clothes to visit and provide snacks.  Per caregivers he does not allow blood draws or injections, has not recently allowed haircuts or toenail care.  Mother attempted outpatient dentistry with sedation x 2 but he would not go in office despite premedication with Valium/Ativan.

## 2024-01-08 NOTE — ASSESSMENT & PLAN NOTE
Currently living in group home with others who have intellectual diabilities.  Caregiver Nadja reports no new behaviors, some recent improvement in allowing care, seems less fearful. Followed by Dr. Danii Martinez, neuropsychiatrist,  via virtual visits and currently managed with Risperdal 3mg twice daily, hydroxyzine 50mg qhs. Per chart review, he was previously taking diazepam.  Staff feel that sleep, behaviors, and mood are well-controlled on this regimen and he has been on this for several years.  He continues to go to a day support program M-F from 7-3.  Outpatient appointment for sedation dentistry attempted x 2 but mother has been unable to get patient in the office due to his fear

## 2024-01-16 ENCOUNTER — CLINICAL DOCUMENTATION (OUTPATIENT)
Facility: CLINIC | Age: 44
End: 2024-01-16

## 2024-01-16 NOTE — PROGRESS NOTES
[facility] - Plan of Care  [address], [address]  [place] [address]    [facility] Team Members: Lamar Rubio MD; Marycarmen Villanueva NP; Noris Ortega NP; Phuong Mckeon, RN; Deshawn Harris, RN; Katty Wang, RN; Ann Lam LCSW    Jonathon Patricia  1980 / [id number]  male    Date of Initial Visit (Start of Care): 6/15/22    Diagnoses  Patient Active Problem List   Diagnosis    Autistic disorder    Moderate intellectual disability    Non-verbally indicates understanding    Elevated blood pressure reading    Nonverbal    Fear of other medical care    At high risk for elopement       Advance Care Planning:    Code Status: Full Code           1/16/2024     9:41 AM   Demographics   Marital Status Single       DME/Supplies:  None- living in [facility] with 24/7 support      Allergies   Allergen Reactions    Haloperidol Lactate      Other reaction(s): Unknown (comments)       Nutritional Requirements:   Oral with supported meal preparation    Functional/Activity Level:  Ambulatory without assistance    Safety Measures:   Fall risk and Self-care deficit    Acuity Level Rating: Medium    Current Outpatient Medications   Medication Sig    miconazole (ZEASORB-AF) 2 % powder Apply topically 2 times daily.    UNABLE TO FIND Please obtain the following labs:  CBC with Diff Z 00.00,   CMP Z 00.00,   TSH Z 00.00, Z 13.29,   Lipid Panel Z 13.220, Z 00.00,  Hgb A1c Z51.81, R 63.5    acetaminophen (TYLENOL) 500 MG tablet TAKE 2 TABLETS BY MOUTH EVERY 6 HOURS AS NEEDED FOR FEVER    docusate (COLACE, DULCOLAX) 100 MG CAPS TAKE (1) CAPSULE BY MOUTH TWICE DAILY    hydrOXYzine pamoate (VISTARIL) 50 MG capsule Take 1 capsule by mouth nightly    risperiDONE (RISPERDAL) 3 MG tablet Take 1 tablet by mouth 2 times daily     No current facility-administered medications for this visit.       PLAN OF CARE    The Plan of Care is initiated within 15 days of the initial provider visit and updated at least every 60 days

## 2024-03-12 ENCOUNTER — CLINICAL DOCUMENTATION (OUTPATIENT)
Facility: CLINIC | Age: 44
End: 2024-03-12

## 2024-03-12 NOTE — PROGRESS NOTES
[facility] - Plan of Care  [address], [address]  [place] [address]    hospitals Team Members: Lamar Rubio MD; Marycarmen Villanueva NP; Noris Ortega NP; Phuong Mckeon, RN; Deshawn Harris, RN; Katty Wang, RN; Ann Lam LCSW    Jonathon Patricia  1980 / [id number]  male    Date of Initial Visit (Start of Care): 6/15/22    Diagnoses  Patient Active Problem List   Diagnosis    Autistic disorder    Moderate intellectual disability    Non-verbally indicates understanding    Elevated blood pressure reading    Nonverbal    Fear of other medical care    At high risk for elopement       Advance Care Planning:    Code Status: Full Code           3/12/2024    11:00 AM   Demographics   Marital Status Single       DME/Supplies:  None- living in [facility] with 24/7 support      Allergies   Allergen Reactions    Haloperidol Lactate      Other reaction(s): Unknown (comments)       Nutritional Requirements:   Oral with supported meal preparation    Functional/Activity Level:  Ambulatory without assistance    Safety Measures:   Fall risk and Self-care deficit    Acuity Level Rating: Medium    Current Outpatient Medications   Medication Sig    miconazole (ZEASORB-AF) 2 % powder Apply topically 2 times daily.    UNABLE TO FIND Please obtain the following labs:  CBC with Diff Z 00.00,   CMP Z 00.00,   TSH Z 00.00, Z 13.29,   Lipid Panel Z 13.220, Z 00.00,  Hgb A1c Z51.81, R 63.5    acetaminophen (TYLENOL) 500 MG tablet TAKE 2 TABLETS BY MOUTH EVERY 6 HOURS AS NEEDED FOR FEVER    docusate (COLACE, DULCOLAX) 100 MG CAPS TAKE (1) CAPSULE BY MOUTH TWICE DAILY    hydrOXYzine pamoate (VISTARIL) 50 MG capsule Take 1 capsule by mouth nightly    risperiDONE (RISPERDAL) 3 MG tablet Take 1 tablet by mouth 2 times daily     No current facility-administered medications for this visit.       PLAN OF CARE    The Plan of Care is initiated within 15 days of the initial provider visit and updated at least every 60 days

## 2024-03-22 ENCOUNTER — TELEPHONE (OUTPATIENT)
Facility: CLINIC | Age: 44
End: 2024-03-22

## 2024-03-22 NOTE — TELEPHONE ENCOUNTER
Pre Op Physical - Nadja Manriquez called to schedule a pre op physical for the patient.  He is going to have a procedure for his teeth on 4/22/24.    Nadja's callback is [phone]

## 2024-03-25 NOTE — TELEPHONE ENCOUNTER
Good morning!    Patient already has an apt 4/8 at 2, but can you please ask if she can change to 4/9 at 2?

## 2024-04-01 ENCOUNTER — TELEPHONE (OUTPATIENT)
Facility: CLINIC | Age: 44
End: 2024-04-01

## 2024-04-01 NOTE — TELEPHONE ENCOUNTER
Appointment reschedule - I called Fabienne Adams to let her know that the Maritza Ortega NP will not be in the office on 4/8/24 and needs to reschedule to 4/9/24, 12-4pm arrival.  Fabienne agreed to the appointment change.  This is complete in [facility] (2pm slot).

## 2024-04-05 ENCOUNTER — TELEPHONE (OUTPATIENT)
Facility: CLINIC | Age: 44
End: 2024-04-05

## 2024-04-05 DIAGNOSIS — K59.00 CONSTIPATION, UNSPECIFIED CONSTIPATION TYPE: Primary | ICD-10-CM

## 2024-04-05 RX ORDER — PSEUDOEPHEDRINE HCL 30 MG
1 TABLET ORAL 2 TIMES DAILY
Qty: 90 CAPSULE | Refills: 1 | Status: SHIPPED | OUTPATIENT
Start: 2024-04-05 | End: 2024-07-04

## 2024-04-05 NOTE — TELEPHONE ENCOUNTER
[facility]  [phone]    Patient Name: Jonathon Patricia  YOB: 1980    Medication Refill Request Triage    Requested by:    []  Patient  [x]  Support person:  Nadja Manriquez    [facility]:  1/8/2024    Next [facility] Visit: 4/9/2024    Preferred Pharmacy: *[facility]  Backup Pharmacy:  *    Medications requested:  *docusate sodium, 100 mgs soft gel    Is patient completely out?  [x]   YES  []   NO  If NO, how many pills does patient have left?  __    Other pertinent information shared:   nadja asks that a script be sent to the pharmacy so that they will not DC the medication on the patient's MAR. Nadja's callback if needed is [phone]

## 2024-04-05 NOTE — TELEPHONE ENCOUNTER
Appropriate for refill per chart review - E-scrip sent to [facility] as requested.

## 2024-04-09 ENCOUNTER — OFFICE VISIT (OUTPATIENT)
Facility: CLINIC | Age: 44
End: 2024-04-09

## 2024-04-09 DIAGNOSIS — Z01.818 PRE-PROCEDURAL EXAMINATION: Primary | ICD-10-CM

## 2024-04-09 DIAGNOSIS — Z91.89 AT HIGH RISK FOR ELOPEMENT: ICD-10-CM

## 2024-04-09 DIAGNOSIS — F84.0 AUTISTIC DISORDER: ICD-10-CM

## 2024-04-09 DIAGNOSIS — F40.232 FEAR OF OTHER MEDICAL CARE: ICD-10-CM

## 2024-04-09 DIAGNOSIS — R47.01 NONVERBAL: ICD-10-CM

## 2024-04-09 NOTE — ASSESSMENT & PLAN NOTE
Patient vocalizes slightly but does not verbalize. Appears to understand requests, follows commands, and makes small vocalizations in response.  He is able to gesture for requests like food/drink or express pain.  Caregiver Nadja BRICEÑO

## 2024-04-09 NOTE — PROGRESS NOTES
Patient visited today.  Patient was visualized ambulating throughout the group home in no acute distress, did not appear to be in any pain and he was at baseline behavior.  He stepped on scale for weight (255lbs).  However, he became fearful of this provider and would not come in the room for assessment or vitals.  He then eloped to the yard and went to this provider's car and pointed and waved to go away.  Caregiver was unable to get patient back in the home until telling patient that I would go away.  Unable to assess for pre-procedural clearance today.

## 2024-04-11 ENCOUNTER — TELEPHONE (OUTPATIENT)
Facility: CLINIC | Age: 44
End: 2024-04-11

## 2024-04-11 NOTE — TELEPHONE ENCOUNTER
Call placed to Fabienne's phone in the group home and left voicemail.  Requested virtual visit with Mr. Patricia tomorrow (would drop off vital signs equipment and then do virtual visit from car).  Requested call back if this would be an option

## 2024-04-12 ENCOUNTER — TELEMEDICINE (OUTPATIENT)
Facility: CLINIC | Age: 44
End: 2024-04-12

## 2024-04-12 DIAGNOSIS — F71 MODERATE INTELLECTUAL DISABILITY: ICD-10-CM

## 2024-04-12 DIAGNOSIS — F84.0 AUTISTIC DISORDER: ICD-10-CM

## 2024-04-12 DIAGNOSIS — F40.232 FEAR OF OTHER MEDICAL CARE: ICD-10-CM

## 2024-04-12 DIAGNOSIS — Z01.818 PRE-PROCEDURAL EXAMINATION: Primary | ICD-10-CM

## 2024-04-12 DIAGNOSIS — R47.01 NONVERBAL: ICD-10-CM

## 2024-04-12 DIAGNOSIS — Z91.89 AT HIGH RISK FOR ELOPEMENT: ICD-10-CM

## 2024-04-12 DIAGNOSIS — R03.0 ELEVATED BLOOD PRESSURE READING: ICD-10-CM

## 2024-04-12 NOTE — PROGRESS NOTES
understanding    Elevated blood pressure reading    Nonverbal    Fear of other medical care    At high risk for elopement      Social History     Socioeconomic History    Marital status: Single     Spouse name: None    Number of children: None    Years of education: None    Highest education level: None   Tobacco Use    Smoking status: Never    Smokeless tobacco: Never   Substance and Sexual Activity    Alcohol use: Never    Drug use: Never     Social Determinants of Health     Financial Resource Strain: Low Risk  (9/15/2023)    Overall Financial Resource Strain (CARDIA)     Difficulty of Paying Living Expenses: Not hard at all   Transportation Needs: No Transportation Needs (9/15/2023)    PRAPARE - Transportation     Lack of Transportation (Medical): No     Lack of Transportation (Non-Medical): No   Physical Activity: Inactive (9/15/2023)    Exercise Vital Sign     Days of Exercise per Week: 0 days     Minutes of Exercise per Session: 0 min   Stress: No Stress Concern Present (9/6/2022)    [employer] [place] [employer] - Occupational Stress Questionnaire     Feeling of Stress : Not at all   Social Connections: Socially Isolated (9/6/2022)    Social Connection and Isolation Panel [NHANES]     Frequency of Communication with Friends and Family: Never     Frequency of Social Gatherings with Friends and Family: Never     Attends Yazidi Services: Never     Active Member of Clubs or Organizations: No     Attends Club or Organization Meetings: Never     Marital Status: Never    Intimate Partner Violence: Not At Risk (9/6/2022)    Humiliation, Afraid, Rape, and Kick questionnaire     Fear of Current or Ex-Partner: No     Emotionally Abused: No     Physically Abused: No     Sexually Abused: No   Housing Stability: Low Risk  (9/15/2023)    Housing Stability Vital Sign     Unable to Pay for Housing in the Last Year: No     Number of Places Lived in the Last Year: 1     Unstable Housing in the Last Year:

## 2024-04-16 DIAGNOSIS — Z13.220 SCREENING CHOLESTEROL LEVEL: Primary | ICD-10-CM

## 2024-04-16 DIAGNOSIS — R03.0 ELEVATED BLOOD PRESSURE READING: ICD-10-CM

## 2024-04-16 DIAGNOSIS — Z91.89 OTHER SPECIFIED PERSONAL RISK FACTORS, NOT ELSEWHERE CLASSIFIED: ICD-10-CM

## 2024-04-16 DIAGNOSIS — Z13.29 THYROID DISORDER SCREEN: ICD-10-CM

## 2024-04-22 ENCOUNTER — TELEPHONE (OUTPATIENT)
Facility: CLINIC | Age: 44
End: 2024-04-22

## 2024-04-22 NOTE — TELEPHONE ENCOUNTER
Elevated BP - Tasha Patricia is the patient's parent.  She called to report to Dr. Mckeon that the patient's BP was high today:    L - 143/92,   R - 132/87 HR 98    Tasha states that she has asked the  to do a 5 day BP check.  She said that the patient has gained a lot of weight and that the new pants she purchased for him, size 42, were snug.  Tasha expressed concern that the patient may have hypertension.  She states that it runs in the family and the patient's uncle has had multiple strokes.    Dental appt - Tasha said that the patient's dental procedure did not go through.  The patient would not get out of the vehicle.    Tasha said that she will be in the [facility] area today and will leave for [place] tomorrow, 4/23/24.  She asks for a callback at [phone]

## 2024-04-23 NOTE — TELEPHONE ENCOUNTER
Telephone call returned to mother Tasha, she is concerned that pt may be having trouble with HTN due to some higher readings and the fact that he has gained a lot of weight.  Tasha states there is a strong family hx of HTN and strokes.  She has asked manager of group Latty to monitor pt's BP daily x 5 days to see if readings are trending high.  Advised that readings should be called to our office after completing 5 days of readings.  Tasha asks that I contact Fabienne in [facility] and ask her to call us with readings.  Telephone call to Fabienne to make request to call us with readings after 5 days.  Advised if pt's readings are trending higher then we will ask provider Jamil Mckeon MD to visit.

## 2024-04-24 ENCOUNTER — TELEPHONE (OUTPATIENT)
Facility: CLINIC | Age: 44
End: 2024-04-24

## 2024-04-24 NOTE — TELEPHONE ENCOUNTER
Fabienne Manriquez called to request that the nurse send over a d/c order for the lorazepam - 6 tablets total.  She states that the patient was given all six tablets.  The pharmacy is [facility] on [address].  Fabienne's callback if needed is [phone]

## 2024-04-25 NOTE — TELEPHONE ENCOUNTER
Telephone call to [facility] [phone], spoke with pharmacist Ryanne to provide a verbal order to discontinue lorazepam and remove from patient's medication profile.  Advised that this was a one time order for pt to receive prior to an outpatient dental procedure but group [facility] is concerned that it still shows on their med profile from [facility].  Ryanne will discontinue med and update profile.

## 2024-04-30 ENCOUNTER — TELEPHONE (OUTPATIENT)
Age: 44
End: 2024-04-30

## 2024-04-30 ENCOUNTER — TELEPHONE (OUTPATIENT)
Facility: CLINIC | Age: 44
End: 2024-04-30

## 2024-04-30 RX ORDER — POLYETHYLENE GLYCOL 3350 17 G/17G
17 POWDER, FOR SOLUTION ORAL DAILY
Qty: 1530 G | Refills: 1 | Status: SHIPPED | OUTPATIENT
Start: 2024-04-30

## 2024-04-30 RX ORDER — POLYETHYLENE GLYCOL 3350 17 G/17G
17 POWDER, FOR SOLUTION ORAL DAILY
Qty: 1530 G | Refills: 1 | Status: SHIPPED | OUTPATIENT
Start: 2024-04-30 | End: 2024-04-30 | Stop reason: SDUPTHER

## 2024-04-30 NOTE — TELEPHONE ENCOUNTER
Telephone call to Dr. Mckeon, verbal order received for Miralax 17 gm daily for constipation.  Order sent to pharmacy.    Telephone home to  Yonny \"Allen\" Anam to advise of new order sent to [facility].

## 2024-04-30 NOTE — TELEPHONE ENCOUNTER
Ana with [facility] is requesting a prescription for Miralax daily. What he is taking now is not working. To be called in to [facility]. Her CB # is [phone]

## 2024-04-30 NOTE — TELEPHONE ENCOUNTER
Yonny, the  is requesting that the Miralax be sent to [facility] for home delivery as [facility] does not do home delivery. # [phone]

## 2024-06-14 ENCOUNTER — TELEPHONE (OUTPATIENT)
Facility: CLINIC | Age: 44
End: 2024-06-14

## 2024-06-14 RX ORDER — AVOBENZONE, HOMOSALATE, OCTISALATE, OCTOCRYLENE, OXYBENZONE 20; 105; 50; 20; 20 MG/ML; MG/ML; MG/ML; MG/ML; MG/ML
10 LOTION TOPICAL 2 TIMES DAILY PRN
Qty: 237 ML | Refills: 3 | Status: SHIPPED | OUTPATIENT
Start: 2024-06-14

## 2024-06-14 NOTE — TELEPHONE ENCOUNTER
BP - Yonny called from the patient's group home to report that the patient's BP was elevated yesterday (132/86) and today (121/83).  He asks if Dr. Mckeon needs to visit the patient.  An appointment has not been scheduled yet.    Sunscreen - Yonny states that the patient needs prescribed sunscreen.  He did not have any other info about the product.    Yonny Mendieta asks for a callback at [phone]

## 2024-06-14 NOTE — TELEPHONE ENCOUNTER
Telephone call returned to  Yonny.  Advised that Bps that he shared are not really too high.  Advised that we would need 5 days of Bps taken at different times of the day to see if pt is having elevated readings.  He verbalized understanding.  He is requesting a prescription for sunscreen be sent to [facility].  Sunscreen rx pended to Dr. Mckeon

## 2024-07-24 ENCOUNTER — TELEPHONE (OUTPATIENT)
Facility: CLINIC | Age: 44
End: 2024-07-24

## 2024-07-24 NOTE — TELEPHONE ENCOUNTER
[facility] Primary Care at Home (PC)   (formerly Home Based Primary Care & Supportive Services)   Phone:  [phone]      Fax:  [phone] [address], [address]  [place] [id number]    Name:  Jonathon Patricia  YOB: 1980      Called and spoke with  Yonny Mendieta, Primary Care at Home follow up appointment for Jonathon Patricia scheduled for 8/20/24 at 12 noon with Dr. Jamil Mckeon.    Phuong Mckeon RN, Gerontological Nursing-BC, [facility]

## 2024-08-01 ENCOUNTER — TELEPHONE (OUTPATIENT)
Facility: CLINIC | Age: 44
End: 2024-08-01

## 2024-08-01 NOTE — TELEPHONE ENCOUNTER
I spoke with Tasha Patricia yesterday to let her know that the Priority packet she sent did not have all of the completed forms. Another packet will go out to her via [employer] Friday, 8/2/2024.      The following email went out to the patient's clinical team:    Good afternoon Team!    As information - I've made multiple attempts, including phone calls, to obtain Jonathon Patricia's Annual Paperwork from his parent, Tasha Patricia. During this 2 month process, a renewal packet was mailed to her in June (6/19/24).  She stated that she mailed back the original packet to [facility] in June.  She also mailed a copy of the paperwork via [facility] Priority Mail on 6/26/24 but the package contained only the patient's demographic and records request form.  Another copy of Jonathon's paperwork will go to Tasha via [employer] tomorrow.  A return envelope with the [facility] address is included in the packet.  Tasha stated that the [place] post office told her that the [facility] address at [facility] was an invalid address, it did not exist so she will return the completed forms to Palliative at [facility].      Thank you!  CARLI Michelle  Primary Care at Home  [address] [address]  Office: [phone]  Fax: [phone]

## 2024-08-14 ENCOUNTER — TELEPHONE (OUTPATIENT)
Facility: CLINIC | Age: 44
End: 2024-08-14

## 2024-08-14 NOTE — TELEPHONE ENCOUNTER
Called patient to confirm their in home visit with Dr. Mckeon on 8/20/2024, arrival between 12-4.  Spoke with Tasha, they confirmed the appointment and answered the covid screen questions. Does anyone in the household have covid no  Have there been any changes to insurance no or location no

## 2024-08-20 ENCOUNTER — OFFICE VISIT (OUTPATIENT)
Facility: CLINIC | Age: 44
End: 2024-08-20

## 2024-08-20 DIAGNOSIS — R47.01 NONVERBAL: Chronic | ICD-10-CM

## 2024-08-20 DIAGNOSIS — F84.0 AUTISTIC DISORDER: Primary | Chronic | ICD-10-CM

## 2024-08-20 DIAGNOSIS — F71 MODERATE INTELLECTUAL DISABILITY: Chronic | ICD-10-CM

## 2024-08-20 DIAGNOSIS — F40.232 FEAR OF OTHER MEDICAL CARE: Chronic | ICD-10-CM

## 2024-08-20 DIAGNOSIS — R05.3 CHRONIC COUGH: ICD-10-CM

## 2024-08-22 PROBLEM — F71 MODERATE INTELLECTUAL DISABILITY: Chronic | Status: ACTIVE | Noted: 2022-06-15

## 2024-08-22 PROBLEM — F84.0 AUTISTIC DISORDER: Chronic | Status: ACTIVE | Noted: 2022-06-15

## 2024-08-22 PROBLEM — F40.232 FEAR OF OTHER MEDICAL CARE: Chronic | Status: ACTIVE | Noted: 2023-09-29

## 2024-08-22 PROBLEM — R47.01 NONVERBAL: Chronic | Status: ACTIVE | Noted: 2023-09-29

## 2024-08-22 RX ORDER — DEXTROMETHORPHAN POLISTIREX 30 MG/5ML
60 SUSPENSION ORAL EVERY 6 HOURS PRN
Qty: 148 ML | Refills: 1 | Status: SHIPPED | OUTPATIENT
Start: 2024-08-22 | End: 2024-08-23 | Stop reason: SDUPTHER

## 2024-08-23 DIAGNOSIS — R05.3 CHRONIC COUGH: ICD-10-CM

## 2024-08-23 RX ORDER — DEXTROMETHORPHAN POLISTIREX 30 MG/5ML
60 SUSPENSION ORAL 2 TIMES DAILY PRN
Qty: 148 ML | Refills: 1 | Status: SHIPPED | OUTPATIENT
Start: 2024-08-23 | End: 2024-10-22

## 2024-08-23 NOTE — PROGRESS NOTES
[facility] CARE SERVICES      ADULT HOME VISIT      Jonathon Patricia    Address: [address] [place], [place] [id number]  1980  [id number]        ASSESSMENT:     Diagnosis Orders   1. Autistic disorder        2. Fear of other medical care        3. Moderate intellectual disability        4. Nonverbal        5. Chronic cough  dextromethorphan (DELSYM) 30 MG/5ML extended release liquid            PLAN:    AUTISM/ INTELLECTUAL DISABILITY: This problem is stable. Will continue close surveillance.    OCCASIONAL COUGH: Will add Delsym.      SUBJECTIVE:    Chief Complaint:  Chief Complaint   Patient presents with    Other     HOME VISIT: AUTISM       History of Present Illness:    Jonathon Patricia is a 43 y.o. male who is seen with his caretaker for an intermittent visit at the adult residential home. He is Autistic and generally avoids contact (including by physicians). He has been fairly healthy and no new problems are reported.     The history is obtained from the caretaker and previous records and is felt to be satisfactory to establish an acceptable plan of care. Health status indicators were reviewed and there are no changes except as noted above. The past medical history, family history, social history and ethnic considerations have been updated as needed. The patient denies any constitutional, ENT, cardiopulmonary, gastrointestinal, or genitourinary issues otherwise as review in the ROS.      Review of Symptoms (from caretaker):    General: Denies weight loss, fever, chills, malaise, fatigue or appetite change  Skin:  Denies rashes or suspicious skin lesions  HEENT: Denies visual disturbance, earache, sore throat, post-nasal drainage, hoarseness dysphagia  Cardiac: Denies chest pain, palpitations, SENIOR, orthopnea, PND, edema  Pulmonary: Denies cough, hemoptysis, wheezing, SOB  GI:  Denies nausea, vomiting, diarrhea, hematemesis, melena, abdominal pain  :  Denies dysuria,

## 2024-08-23 NOTE — TELEPHONE ENCOUNTER
Fax sent to office from [facility] pharmacy requesting Clarification of Dextromethorphan   - message sent to Dr. Mckeon and received order to change Rx to Every 12 Hours.

## 2024-08-29 ENCOUNTER — TELEPHONE (OUTPATIENT)
Facility: CLINIC | Age: 44
End: 2024-08-29

## 2024-08-29 NOTE — TELEPHONE ENCOUNTER
Yonny Mendieta called to request a f/u appointment for the patient and a physical exam.  He states that the patient was last seen by Dr. Mckeon on 8/20/24.  Yonny's callback is [phone].

## 2024-08-29 NOTE — TELEPHONE ENCOUNTER
Telephone call returned to Yonny to determine if patient has an acute need for an appt or if he is looking to make the next follow up appt.  Yonny says that he needs for patient to have a physical exam to meet the guidelines of the home and state regs.  We discussed that historically [place] does not allow the provider to touch him, he becomes agitated and runs.  Yonny says that the patient needs a physical exam before the end of the year.   Yonny states that he needs for the provider to attempt a physical exam and that he will assist the provider with patient.  He states when Dr. Mckeon visited on 8/20 that he did not attempt to touch the patient at all.  Advised that per Dr. Mckeon's note from 8/20/24 visit he should be seen again mid November.   Offered November 13 visit with Dr. Mckeon and advised that this nurse will share his requests about physical exam.  Explained that it is always our provider's goal to do a hands on physical exam and vital signs but [place] has not allowed at previous visits.  11/13 visit offer accepted between 10 AM and 4 PM.

## 2024-10-08 ENCOUNTER — TELEPHONE (OUTPATIENT)
Facility: CLINIC | Age: 44
End: 2024-10-08

## 2024-10-08 NOTE — TELEPHONE ENCOUNTER
Yang Hays is one of the care managers at the [facility]'s [facility].  He called to verify the patient's next appointment.  I let Yang know that the patient is scheduled for an in home visit on Wednesday, 11/13/24, arrival between 10am-4pm and he will also receive a reminder call a few days before the appointment.  Yang acknowledged.  His callback if needed is [phone].

## 2024-10-10 ENCOUNTER — SOCIAL WORK (OUTPATIENT)
Facility: CLINIC | Age: 44
End: 2024-10-10

## 2024-10-10 DIAGNOSIS — K59.00 CONSTIPATION, UNSPECIFIED CONSTIPATION TYPE: ICD-10-CM

## 2024-10-10 SDOH — SOCIAL STABILITY: SOCIAL NETWORK: ARE YOU MARRIED, WIDOWED, DIVORCED, SEPARATED, NEVER MARRIED, OR LIVING WITH A PARTNER?: NEVER MARRIED

## 2024-10-10 SDOH — ECONOMIC STABILITY: FOOD INSECURITY: WITHIN THE PAST 12 MONTHS, THE FOOD YOU BOUGHT JUST DIDN'T LAST AND YOU DIDN'T HAVE MONEY TO GET MORE.: NEVER TRUE

## 2024-10-10 SDOH — SOCIAL STABILITY: SOCIAL NETWORK: HOW OFTEN DO YOU GET TOGETHER WITH FRIENDS OR RELATIVES?: NEVER

## 2024-10-10 SDOH — SOCIAL STABILITY: SOCIAL NETWORK
DO YOU BELONG TO ANY CLUBS OR ORGANIZATIONS SUCH AS CHURCH GROUPS UNIONS, FRATERNAL OR ATHLETIC GROUPS, OR SCHOOL GROUPS?: NO

## 2024-10-10 SDOH — ECONOMIC STABILITY: INCOME INSECURITY: HOW HARD IS IT FOR YOU TO PAY FOR THE VERY BASICS LIKE FOOD, HOUSING, MEDICAL CARE, AND HEATING?: NOT HARD AT ALL

## 2024-10-10 SDOH — HEALTH STABILITY: MENTAL HEALTH: HOW OFTEN DO YOU HAVE A DRINK CONTAINING ALCOHOL?: NEVER

## 2024-10-10 SDOH — HEALTH STABILITY: PHYSICAL HEALTH: ON AVERAGE, HOW MANY DAYS PER WEEK DO YOU ENGAGE IN MODERATE TO STRENUOUS EXERCISE (LIKE A BRISK WALK)?: 0 DAYS

## 2024-10-10 SDOH — HEALTH STABILITY: PHYSICAL HEALTH: ON AVERAGE, HOW MANY MINUTES DO YOU ENGAGE IN EXERCISE AT THIS LEVEL?: 0 MIN

## 2024-10-10 SDOH — ECONOMIC STABILITY: FOOD INSECURITY: WITHIN THE PAST 12 MONTHS, YOU WORRIED THAT YOUR FOOD WOULD RUN OUT BEFORE YOU GOT MONEY TO BUY MORE.: NEVER TRUE

## 2024-10-10 SDOH — SOCIAL STABILITY: SOCIAL NETWORK: HOW OFTEN DO YOU ATTENT MEETINGS OF THE CLUB OR ORGANIZATION YOU BELONG TO?: NEVER

## 2024-10-10 SDOH — HEALTH STABILITY: MENTAL HEALTH: HOW MANY STANDARD DRINKS CONTAINING ALCOHOL DO YOU HAVE ON A TYPICAL DAY?: PATIENT DOES NOT DRINK

## 2024-10-10 SDOH — HEALTH STABILITY: MENTAL HEALTH
STRESS IS WHEN SOMEONE FEELS TENSE, NERVOUS, ANXIOUS, OR CAN'T SLEEP AT NIGHT BECAUSE THEIR MIND IS TROUBLED. HOW STRESSED ARE YOU?: NOT AT ALL

## 2024-10-10 SDOH — ECONOMIC STABILITY: INCOME INSECURITY: IN THE LAST 12 MONTHS, WAS THERE A TIME WHEN YOU WERE NOT ABLE TO PAY THE MORTGAGE OR RENT ON TIME?: NO

## 2024-10-10 SDOH — SOCIAL STABILITY: SOCIAL NETWORK: IN A TYPICAL WEEK, HOW MANY TIMES DO YOU TALK ON THE PHONE WITH FAMILY, FRIENDS, OR NEIGHBORS?: NEVER

## 2024-10-10 SDOH — SOCIAL STABILITY: SOCIAL NETWORK: HOW OFTEN DO YOU ATTEND CHURCH OR RELIGIOUS SERVICES?: NEVER

## 2024-10-10 NOTE — PROGRESS NOTES
Social Determinants of Health screening updated.     GINNY Mcgregor, LCSW     [facility]  Primary Care at Home  [facility]   [address], [address]   [place] [address]  (C) [phone]  (O) [phone]  [email or web address]

## 2024-10-11 RX ORDER — PSEUDOEPHEDRINE HCL 30 MG
1 TABLET ORAL 2 TIMES DAILY
Qty: 90 CAPSULE | Refills: 1 | Status: SHIPPED | OUTPATIENT
Start: 2024-10-11 | End: 2025-01-09

## 2024-11-07 ENCOUNTER — TELEPHONE (OUTPATIENT)
Facility: CLINIC | Age: 44
End: 2024-11-07

## 2024-11-07 NOTE — TELEPHONE ENCOUNTER
This is the number for Tasha in patient contacts: [phone]  When I called, there was a generic greeting.  I left a VM for her to call back [facility].

## 2024-11-07 NOTE — TELEPHONE ENCOUNTER
Attempted to call pt about their upcoming appt with Dr. Mckeon on 11/13/2024 arrival window 10-4. Attempted to call Yonny, jose raul and [employer] [place] and all phone numbers are not in service.

## 2024-11-07 NOTE — TELEPHONE ENCOUNTER
Telephone call to Yang Hays with [facility].  He reports that  Yonny no longer works there.  He advises that new  is Linda Rojas and house number is [phone].  Confirmed Dr. Mckeon's visit on 11/13 with arrival between 10 - 4, Melissa will be kept out of day support that day and will be available for provider visit.  Asked for updated contact number for [employer] Patricia, Yang will call back to provide this number.

## 2024-11-13 ENCOUNTER — OFFICE VISIT (OUTPATIENT)
Facility: CLINIC | Age: 44
End: 2024-11-13
Payer: COMMERCIAL

## 2024-11-13 VITALS
DIASTOLIC BLOOD PRESSURE: 121 MMHG | RESPIRATION RATE: 14 BRPM | TEMPERATURE: 96.9 F | HEART RATE: 101 BPM | OXYGEN SATURATION: 99 % | SYSTOLIC BLOOD PRESSURE: 181 MMHG

## 2024-11-13 DIAGNOSIS — F84.0 AUTISTIC DISORDER: Chronic | ICD-10-CM

## 2024-11-13 DIAGNOSIS — F71 MODERATE INTELLECTUAL DISABILITY: Chronic | ICD-10-CM

## 2024-11-13 DIAGNOSIS — I10 ESSENTIAL HYPERTENSION: Primary | ICD-10-CM

## 2024-11-13 PROCEDURE — 99349 HOME/RES VST EST MOD MDM 40: CPT | Performed by: FAMILY MEDICINE

## 2024-11-13 PROCEDURE — 3080F DIAST BP >= 90 MM HG: CPT | Performed by: FAMILY MEDICINE

## 2024-11-13 PROCEDURE — 3077F SYST BP >= 140 MM HG: CPT | Performed by: FAMILY MEDICINE

## 2024-11-13 RX ORDER — AMLODIPINE BESYLATE 5 MG/1
5 TABLET ORAL DAILY
Qty: 90 TABLET | Refills: 3 | Status: SHIPPED | OUTPATIENT
Start: 2024-11-13 | End: 2025-11-13

## 2024-11-13 NOTE — PROGRESS NOTES
[facility] SERVICES      Primary Care At Home - Home Visit      Name: Jonathon Patricia    Address: [address] [place] [address]    :  1980  MRN:  [id number]      ASSESSMENT:     Diagnosis Orders   1. Essential hypertension        2. Autistic disorder        3. Moderate intellectual disability              PLAN:    HYPERTENSION: This problem is new. His BP may be elevated because of his fear of strangers. Nevertheless, he should be treated. Will add Amlodipine and recheck in 3-4 weeks. Forms were completed for the Adult Home.    AUTISM/ INTELLECTUAL DISABILITY: This problem is stable. Will continue close surveillance.      SUBJECTIVE:    Chief Complaint:  Chief Complaint   Patient presents with    Other     HOME VISIT: HTN/ AUTISM       History of Present Illness:    Jonathon Patricia is a 43 y.o. male who is seen for an [facility] Primary Care At Home Home Visit. It would be physically and emotionally difficult to take the patient from the home to seek primary care services in the community. The patient is homebound as a result of multiple medical problems including Autism. It is difficult to examine the patient in view of his reclusiveness and apparent distrust of strangers. No new problems are reported by his caretakers.    The patient has a number of chronic medical problems as listed above.    He was unable to have a dental exam recently because of his distrust of strangers.    The history is obtained from the caretakers and previous records and is felt to be satisfactory to establish an acceptable plan of care. Health status indicators were reviewed and there are no changes except as noted above. The past medical history, family history, social history and ethnic considerations have been updated as needed. The patient denies any constitutional, ENT, cardiopulmonary, gastrointestinal, or genitourinary issues otherwise as review in the ROS.      Review of

## 2024-12-06 ENCOUNTER — TELEPHONE (OUTPATIENT)
Facility: CLINIC | Age: 44
End: 2024-12-06

## 2024-12-06 NOTE — TELEPHONE ENCOUNTER
[facility] Primary Care at Home (PC)   (formerly Home Based Primary Care & Supportive Services)   Phone:  [phone]      Fax:  [phone] [address], [address]  [place] [id number]    Name:  Jonathon Patricia  YOB: 1980      Called Linda Rojas to schedule Ingridon Belem's 1 month Primary Care at Home follow up appointment with Dr. Jamil Mckeon.  Appointment scheduled for 12/18/24.    Phuong Mckeon RN, Gerontological Nursing-BC, [facility]

## 2024-12-12 ENCOUNTER — TELEPHONE (OUTPATIENT)
Facility: CLINIC | Age: 44
End: 2024-12-12

## 2024-12-12 NOTE — TELEPHONE ENCOUNTER
Called patient to confirm their in home visit with Dr. Mckeon on 12/18/2024, arrival between 10-4.  Spoke with Linda, they confirmed the appointment and answered the covid screen questions. Does anyone in the household have covid no  Have there been any changes to insurance no or location no

## 2024-12-18 ENCOUNTER — OFFICE VISIT (OUTPATIENT)
Facility: CLINIC | Age: 44
End: 2024-12-18
Payer: COMMERCIAL

## 2024-12-18 ENCOUNTER — TELEPHONE (OUTPATIENT)
Facility: CLINIC | Age: 44
End: 2024-12-18

## 2024-12-18 VITALS — SYSTOLIC BLOOD PRESSURE: 167 MMHG | DIASTOLIC BLOOD PRESSURE: 92 MMHG

## 2024-12-18 DIAGNOSIS — I10 ESSENTIAL HYPERTENSION: Primary | Chronic | ICD-10-CM

## 2024-12-18 DIAGNOSIS — F84.0 AUTISTIC DISORDER: Chronic | ICD-10-CM

## 2024-12-18 DIAGNOSIS — F71 MODERATE INTELLECTUAL DISABILITY: Chronic | ICD-10-CM

## 2024-12-18 PROCEDURE — 99349 HOME/RES VST EST MOD MDM 40: CPT | Performed by: FAMILY MEDICINE

## 2024-12-18 PROCEDURE — 3077F SYST BP >= 140 MM HG: CPT | Performed by: FAMILY MEDICINE

## 2024-12-18 PROCEDURE — 3080F DIAST BP >= 90 MM HG: CPT | Performed by: FAMILY MEDICINE

## 2024-12-18 RX ORDER — RISPERIDONE 3 MG/1
3 TABLET ORAL 2 TIMES DAILY
Qty: 180 TABLET | Refills: 3 | Status: SHIPPED | OUTPATIENT
Start: 2024-12-18 | End: 2025-12-18

## 2024-12-18 NOTE — PROGRESS NOTES
[facility]      Primary Care At Home - Home Visit      Name: Jonathon Patricia    Address: [address] [place] [address]    :  1980  MRN:  [id number]      ASSESSMENT:     Diagnosis Orders   1. Essential hypertension        2. Autistic disorder        3. Moderate intellectual disability              PLAN:    HTN: This problem is stable. BP remains elevated. He is very anxious in the presence of strangers - including medical personnel. Will continue the current routine and have the staff check his BP regularly. Will recheck in 1 month.    AUTISM/ INTELLECTUALLY DISABLED: This problem is stable. Will continue close surveillance.      SUBJECTIVE:    Chief Complaint:  Chief Complaint   Patient presents with    Other     HOME VISIT: HTN/ AUTISM       History of Present Illness:    Jonathon Patricia is a 44 y.o. male who is seen with his caretaker for an [facility] Primary Care At Home Home Visit. It would be physically and emotionally difficult to take the patient from the home to seek primary care services in the community. The patient is homebound as a result of Autism.     He has HTN and it is difficult to monitor his progress in view of his reluctance to have his BP taken.     The history is obtained from the caretaker and previous records and is felt to be satisfactory to establish an acceptable plan of care. Health status indicators were reviewed and there are no changes except as noted above. The past medical history, family history, social history and ethnic considerations have been updated as needed. The patient denies any constitutional, ENT, cardiopulmonary, gastrointestinal, or genitourinary issues otherwise as review in the ROS.      Review of Symptoms (from caretaker):    General: Denies weight loss, fever, chills, malaise, fatigue or appetite change  Skin:  Denies rashes or suspicious skin lesions  HEENT: Denies visual disturbance, earache, sore

## 2024-12-18 NOTE — TELEPHONE ENCOUNTER
Linda Rojas called to request an order ,for the patient's Care Home, to take the patient's BP and what to do if his BP is too high.  # [phone].  Linda will callback with care home fax number.      Linda called and gave the following fax # for the patient care home:    Fax: [phone]  Attn: Fouzia Julian

## 2024-12-23 NOTE — TELEPHONE ENCOUNTER
The following order was faxed to group home, with attention to Fouzia Julian:  Please check BP 3 times each week and record on a log.  Goal for his BP is 100/60 to 140/90  Dr. Mckeon will review the BP log at time of his visits.  For the first 2 weeks of taking BP, we anticipate that Melissa's BP will be elevated, until he gets accustomed to having BP taken.    After first 2 weeks please notify Dr. Mckeon if patient's BP is greater than 150/90 or less than 100/60 for two days in one week.    Notify Dr. Mckeon immediately of /100 or higher.

## 2024-12-27 ENCOUNTER — TELEPHONE (OUTPATIENT)
Facility: CLINIC | Age: 44
End: 2024-12-27

## 2024-12-27 NOTE — TELEPHONE ENCOUNTER
[facility] Primary Care at Home ([facility])   Phone:  [phone]      Fax:  [phone] [address], [address]  [place] [id number]    Name:  Jonathon Patricia  YOB: 1980    Called and spoke with caregiver Linda Rojas to schedule Jonathon Patricia's one month Primary Care at Home follow up appointment with Dr. Jamil Mckeon.  Appointment scheduled for 1/22/25.        Phuong Mckeon RN, Gerontological Nursing-BC, [facility]

## 2025-01-17 ENCOUNTER — TELEPHONE (OUTPATIENT)
Facility: CLINIC | Age: 45
End: 2025-01-17

## 2025-01-17 NOTE — TELEPHONE ENCOUNTER
Called patient to confirm their in home visit with Dr. Mckeon on 01/22/2025, arrival between 10-4.  Spoke with Linda, they confirmed the appointment and answered the covid screen questions. Does anyone in the household have covid no  Have there been any changes to insurance no or location no

## 2025-01-22 ENCOUNTER — OFFICE VISIT (OUTPATIENT)
Facility: CLINIC | Age: 45
End: 2025-01-22

## 2025-01-22 VITALS — HEART RATE: 81 BPM | SYSTOLIC BLOOD PRESSURE: 122 MMHG | DIASTOLIC BLOOD PRESSURE: 83 MMHG

## 2025-01-22 DIAGNOSIS — R47.01 NONVERBAL: Chronic | ICD-10-CM

## 2025-01-22 DIAGNOSIS — I10 ESSENTIAL HYPERTENSION: Primary | Chronic | ICD-10-CM

## 2025-01-22 DIAGNOSIS — F71 MODERATE INTELLECTUAL DISABILITY: Chronic | ICD-10-CM

## 2025-01-22 DIAGNOSIS — F84.0 AUTISTIC DISORDER: Chronic | ICD-10-CM

## 2025-01-22 NOTE — PROGRESS NOTES
[facility] SERVICES      Primary Care At Home - Home Visit      Name: Jonathon Patricia    Address: [address] [place] [address]    :  1980  MRN:  [id number]      ASSESSMENT:     Diagnosis Orders   1. Essential hypertension        2. Autistic disorder        3. Moderate intellectual disability        4. Nonverbal              PLAN:    HYPERTENSION: This problem is stable. Current treatment was continued as noted above. Will recheck in 3 months.    AUTISM WITH INTELLECTUAL DISABILITY: This problem is stable. Will continue close surveillance.      SUBJECTIVE:    Chief Complaint:  Chief Complaint   Patient presents with    Other     HOME VISIT: HTN/ AUTISM       History of Present Illness:    Jonathon Patricia is a 44 y.o. male who is seen for an [facility] Primary Care At Home Home Visit. It would be physically and emotionally difficult to take the patient from the home to seek primary care services in the community. The patient is homebound as a result of very limited mobility due to multiple medical problems including Autism. He is not very social and becomes agitated at times.    The patient has a number of chronic medical problems as listed above.    The history is obtained from the patient, family and previous records and is felt to be satisfactory to establish an acceptable plan of care. Health status indicators were reviewed and there are no changes except as noted above. The past medical history, family history, social history and ethnic considerations have been updated as needed. The patient denies any constitutional, ENT, cardiopulmonary, gastrointestinal, or genitourinary issues otherwise as review in the ROS.      Review of Symptoms (from caretaker):    General: Denies weight loss, fever, chills, malaise, fatigue or appetite change  Skin:  Denies rashes or suspicious skin lesions  HEENT: Denies earache, sore throat, post-nasal drainage, hoarseness

## 2025-02-10 ENCOUNTER — TELEPHONE (OUTPATIENT)
Facility: CLINIC | Age: 45
End: 2025-02-10

## 2025-02-10 NOTE — TELEPHONE ENCOUNTER
Telephone call to caregiver Linda to schedule 3 month follow up appt with Dr. Mckeon on 4/23/25 between 10 - 4.

## 2025-02-19 ENCOUNTER — TELEPHONE (OUTPATIENT)
Facility: CLINIC | Age: 45
End: 2025-02-19

## 2025-02-19 NOTE — TELEPHONE ENCOUNTER
[facility] Primary Care at Home ([facility])   Phone:  [phone]      Fax:  [phone] [address]  [place] [address]    Name:  Jonathon Patricia  YOB: 1980    Returned call to Linda Rojas.  She states that she has a form for a required annual physical exam for Jonathon Patricia that needs to be completed.  They forgot to give it to Dr. Mckeon on his 1/22/25 visit.      MsAkilah Crystal says that she emailed the form to CARLI Calvert today.  If Dr. Mckeon has the information he needs from the 12/18/24 and 1/22/25 visits, MsAkilah Crystal requests that he complete and sign the form and return it to her.  If he needs to see patient again in order to complete the form, Ms. Crystal requests the visit be scheduled soon.     Phuong Mckeon RN, Gerontological Nursing-BC, [facility]

## 2025-02-19 NOTE — TELEPHONE ENCOUNTER
Linda Crystal called from the patient's care home to request a physical exam visit from Dr. Mckeon for the patient.  She states that it is way overdue and the patient's 4/23 in home visit is too far away to complete the physical.  She asks for an earlier appointment.  Linda will email a blank form to [facility] for completion at the time of the physical.  Her callback is [phone].

## 2025-04-11 DIAGNOSIS — K59.00 CONSTIPATION, UNSPECIFIED CONSTIPATION TYPE: ICD-10-CM

## 2025-04-11 RX ORDER — DOCUSATE SODIUM 100 MG/1
CAPSULE, LIQUID FILLED ORAL
Qty: 60 CAPSULE | Refills: 0 | Status: SHIPPED | OUTPATIENT
Start: 2025-04-11

## 2025-04-18 ENCOUNTER — TELEPHONE (OUTPATIENT)
Facility: CLINIC | Age: 45
End: 2025-04-18

## 2025-04-18 NOTE — TELEPHONE ENCOUNTER
Called patient to confirm their in home visit with Dr. Campbell on 04/23/2025, arrival between 9-1.  Spoke with Tasha, they confirmed the appointment and answered the covid screen questions. Does anyone in the household have covid no  Have there been any changes to insurance no or location no

## 2025-04-23 ENCOUNTER — OFFICE VISIT (OUTPATIENT)
Facility: CLINIC | Age: 45
End: 2025-04-23
Payer: COMMERCIAL

## 2025-04-23 VITALS — DIASTOLIC BLOOD PRESSURE: 76 MMHG | HEART RATE: 94 BPM | SYSTOLIC BLOOD PRESSURE: 126 MMHG | OXYGEN SATURATION: 97 %

## 2025-04-23 DIAGNOSIS — F84.0 AUTISTIC DISORDER: Chronic | ICD-10-CM

## 2025-04-23 DIAGNOSIS — I10 ESSENTIAL HYPERTENSION: Primary | Chronic | ICD-10-CM

## 2025-04-23 DIAGNOSIS — F71 MODERATE INTELLECTUAL DISABILITY: Chronic | ICD-10-CM

## 2025-04-23 DIAGNOSIS — R47.01 NONVERBAL: Chronic | ICD-10-CM

## 2025-04-23 DIAGNOSIS — F40.232 FEAR OF OTHER MEDICAL CARE: Chronic | ICD-10-CM

## 2025-04-23 PROBLEM — R03.0 ELEVATED BLOOD PRESSURE READING: Status: RESOLVED | Noted: 2023-09-29 | Resolved: 2025-04-23

## 2025-04-23 PROCEDURE — 99348 HOME/RES VST EST LOW MDM 30: CPT | Performed by: FAMILY MEDICINE

## 2025-04-23 PROCEDURE — 3078F DIAST BP <80 MM HG: CPT | Performed by: FAMILY MEDICINE

## 2025-04-23 PROCEDURE — 3074F SYST BP LT 130 MM HG: CPT | Performed by: FAMILY MEDICINE

## 2025-04-23 NOTE — PROGRESS NOTES
[facility]    Primary Care At Home - Home Visit    Name: Jonathon Patricia    Address: [address] [place] [address]    :  1980  MRN:  [id number]    ASSESSMENT/PLAN:    Assessment & Plan  Essential hypertension     At goal, consuming little sodium, physically active  Continue amlodipine       Autistic disorder     Stable  Continue risperdal       Nonverbal     Stable       Moderate intellectual disability     Stable     Fear of other medical care     Interferes with physical exam, but he is clinically and symptomatically stable         SUBJECTIVE:    Chief Complaint:  Chief Complaint   Patient presents with    Follow-up     hypertension       History of Present Illness:    Jonathon Patricia is a 44 y.o. male who is seen for an [facility] Visit. It would be physically and emotionally difficult to take the patient from the home to seek primary care services in the community. The patient is homebound as a result of very limited mobility due to multiple medical problems including due to intellectual disability. He currently lives in a group home.  The patient has a number of chronic medical problems as listed above.  He is seeing behavioral therapist and soon a speech therapist who will trial an ipad for communication.  The history is obtained from the patient, caregiver and previous records and is felt to be satisfactory to establish an acceptable plan of care. Health status indicators were reviewed and there are no changes except as noted above. The past medical history, family history, social history and ethnic considerations have been updated as needed. The patient denies any constitutional, ENT, cardiopulmonary, gastrointestinal, or genitourinary issues otherwise as review in the ROS.    Review of Symptoms:  Per caregiver  General: Denies unintentional weight loss, fever, chills, malaise, fatigue or appetite change. Caregiver has been limiting

## 2025-04-24 ENCOUNTER — TELEPHONE (OUTPATIENT)
Facility: CLINIC | Age: 45
End: 2025-04-24

## 2025-04-24 RX ORDER — ACETAMINOPHEN 500 MG
1000 TABLET ORAL EVERY 6 HOURS PRN
Qty: 120 TABLET | Refills: 1 | Status: SHIPPED | OUTPATIENT
Start: 2025-04-24 | End: 2025-07-23

## 2025-04-24 NOTE — TELEPHONE ENCOUNTER
VM-3:36 pm-The patient's care home left a message requesting an order to cancel the patient's laxative medication, stating that he no longer needs it.  The callers name was not audible.  Her callback # is [phone]

## 2025-04-24 NOTE — TELEPHONE ENCOUNTER
Fax received from [facility] pharmacy requesting refill of Acetaminophen 500mg.    Rx pended to PCP.

## 2025-04-25 NOTE — TELEPHONE ENCOUNTER
Telephone call returned to Linda Rojas at group home, no answer, detailed VM left asking for call back.

## 2025-04-30 ENCOUNTER — TELEPHONE (OUTPATIENT)
Facility: CLINIC | Age: 45
End: 2025-04-30

## 2025-05-01 ENCOUNTER — TELEPHONE (OUTPATIENT)
Facility: CLINIC | Age: 45
End: 2025-05-01

## 2025-05-01 NOTE — TELEPHONE ENCOUNTER
Linda Rojas called from the patient's care home.  She asks that the provider send an order to the [facility] pharmacy to discontinue the patient's polyethylene glycol powder.  The patient no longer needs this medication.  Linda's callback if needed is [phone]

## 2025-05-01 NOTE — TELEPHONE ENCOUNTER
Telephone call returned to Linda to confirm [facility].  Telephone to pharmacist Ryanne, verbal order provided to discontinue polyethylene glycol.  VORB.

## 2025-05-13 DIAGNOSIS — K59.00 CONSTIPATION, UNSPECIFIED CONSTIPATION TYPE: ICD-10-CM

## 2025-05-13 RX ORDER — DOCUSATE SODIUM 100 MG/1
100 CAPSULE, LIQUID FILLED ORAL 2 TIMES DAILY
Qty: 90 CAPSULE | Refills: 1 | Status: SHIPPED | OUTPATIENT
Start: 2025-05-13

## 2025-05-13 NOTE — TELEPHONE ENCOUNTER
Fax received from [facility] pharmacy requesting refill of docusate 100mg  - Take 1 cap PO BID.    Rx pended to PCP.

## 2025-05-20 ENCOUNTER — TELEPHONE (OUTPATIENT)
Facility: CLINIC | Age: 45
End: 2025-05-20

## 2025-05-20 DIAGNOSIS — Z11.1 TUBERCULOSIS SCREENING: Primary | ICD-10-CM

## 2025-05-20 NOTE — TELEPHONE ENCOUNTER
Tasha Belem is the patient's parent.  She called to request that the patient have a chest xray.  He is moving to a new day program and they require an xray or TB Test for admission to the program.  Tasha asks that Linda Rojas be contacted regarding the xray.  Linda's # [phone]

## 2025-05-21 ENCOUNTER — TELEPHONE (OUTPATIENT)
Facility: CLINIC | Age: 45
End: 2025-05-21

## 2025-05-21 DIAGNOSIS — Z11.1 TUBERCULOSIS SCREENING: ICD-10-CM

## 2025-05-21 NOTE — TELEPHONE ENCOUNTER
Telephone call to [employer], advised that chest x-ray has been completed and need to determine where this result should be sent.  Tasha requests that it be faxed to [facility] a Fax [phone]. Fax sent as requested.

## 2025-05-22 ENCOUNTER — TELEPHONE (OUTPATIENT)
Facility: CLINIC | Age: 45
End: 2025-05-22

## 2025-05-22 NOTE — TELEPHONE ENCOUNTER
Telephone call returned to [employer].  She states that new Day Program says they did not received chest x-ray fax.  Tasha requests that results be emailed to her at [email or web address].  Results sent to mom.

## 2025-06-09 ENCOUNTER — TELEPHONE (OUTPATIENT)
Facility: CLINIC | Age: 45
End: 2025-06-09

## 2025-06-09 NOTE — TELEPHONE ENCOUNTER
Telephone call returned to Yessenia with group home.  She states pt has scratched skin off of 2 places on his face.  They are small and shallow, no s/s of infection.  Advised to clean daily with soap and water and leave open to air.  Reviewed s/s of infection to report to our office.  Inquired if pt is still being followed by neuro psych, Dr. Martinez, and Yessenia reports he is.  Advised Yessenia to follow up with psych about his skin picking behaviors as he has past history of self harm with similar behavior.

## 2025-06-09 NOTE — TELEPHONE ENCOUNTER
Telephone call returned to Yessenia with group [place].  She is requesting written instructions be faxed to [phone] for care of the scratches on his face.  This will allow them to place on their MAR to make sure it is done daily.  The following was faxed:    Group Home Staff:     For scratches on patient's face;  wash once daily with soap and water, pat dry with towel and allow to remain open to air.     Observe for signs of infection and report any present to our office:  redness, swelling, hot to touch, increased tenderness or creamy drainage from wound

## 2025-06-09 NOTE — TELEPHONE ENCOUNTER
VM--1:45 pm- Yessenia (sounded like ) requests a callback.  She has some questions.  # [phone]

## 2025-06-09 NOTE — TELEPHONE ENCOUNTER
Yessenia Gregorio called from [place] at [facility].  She states that the patient has been picking at his face and now has 2 small skin tears on his left cheek.  She asks for a callback at [phone].

## 2025-06-17 ENCOUNTER — TELEPHONE (OUTPATIENT)
Facility: CLINIC | Age: 45
End: 2025-06-17

## 2025-06-17 RX ORDER — SULFAMETHOXAZOLE AND TRIMETHOPRIM 800; 160 MG/1; MG/1
1 TABLET ORAL 2 TIMES DAILY
Qty: 6 TABLET | Refills: 0 | Status: SHIPPED | OUTPATIENT
Start: 2025-06-17 | End: 2025-06-20

## 2025-06-17 NOTE — TELEPHONE ENCOUNTER
Verbal order from Dr. Campbell for Bactrim DS 1 tab PO BID x 3 days #6 with no refills.    Telephone call returned to Yessenia, she reports pt is out of sorts, urine dark and frequent voiding x couple of days.  He is also pulling at his privates.  Asked when he was last treated for a UTI - she is unaware of him having a UTI since she has been working at the home.  Advised that Dr. Campbell is willing to treat for UTI this time but if he has symptoms that recur then he will need to have a urine sample taken to the lab before she will treat again.  Confirmed with Yessenia that only allergy documented is to Haldol.  Advised that provider is prescribing Bactrim DS 1 tab PO BID x 3 days.  Confirmed [facility].  Encouraged Yessenia to push PO fluids and advise our office about any chills/fever or failure to improve.

## 2025-06-17 NOTE — TELEPHONE ENCOUNTER
Yessenia Gregorio called to report that the patient is acting out of sorts and may have another UTI.  His urine is dark, he keeps grabbing at his private area and voids frequently.  Yessenia asks for a callback at [phone].

## 2025-06-26 ENCOUNTER — TELEPHONE (OUTPATIENT)
Facility: CLINIC | Age: 45
End: 2025-06-26

## 2025-06-26 NOTE — TELEPHONE ENCOUNTER
Yessenia Gregorio is a caregiver for the patient.  She called to report that the patient is hitting his face on one side a lot like he is in pain. The patient shook his head yes to an offer for pain medication (tylenol).  The patient is constantly shaking his head and always patting/hitting the same side of his face near the jaw line.      Yessenia states that they have a mobile dentis service coming to see the patient.  They found the dentist through the [place] [facility].    Yessenia's # [phone]

## 2025-07-07 ENCOUNTER — TELEPHONE (OUTPATIENT)
Facility: CLINIC | Age: 45
End: 2025-07-07

## 2025-07-07 ENCOUNTER — OFFICE VISIT (OUTPATIENT)
Facility: CLINIC | Age: 45
End: 2025-07-07
Payer: MEDICAID

## 2025-07-07 DIAGNOSIS — K08.89 PAIN, DENTAL: Primary | ICD-10-CM

## 2025-07-07 PROCEDURE — 99347 HOME/RES VST EST SF MDM 20: CPT | Performed by: FAMILY MEDICINE

## 2025-07-07 RX ORDER — AMOXICILLIN 875 MG/1
875 TABLET, COATED ORAL 2 TIMES DAILY
Qty: 20 TABLET | Refills: 0 | Status: SHIPPED | OUTPATIENT
Start: 2025-07-07 | End: 2025-07-17

## 2025-07-07 NOTE — TELEPHONE ENCOUNTER
Call returned to mom Tasha.  Advised that Dr. Campbell can see pt this afternoon between 12 - 4.  Discussed that [facility] only has a preventative home dental visit quarterly and nothing for dental emergencies in between visits.  They have identified a dentist that can see him in [facility] but Tasha is unsure of how that might work, she is going to call them to discuss.  She is appreciative of Dr. Campbell being willing to visit and is at a loss for what to do next.

## 2025-07-07 NOTE — PROGRESS NOTES
[facility]    Primary Care At Home - Home Visit    Name: Jonathon Patricia    Address: [address]    :  1980  MRN:  [id number]      ASSESSMENT/PLAN:  Assessment & Plan  Pain, dental      Essentially empiric treatment with amoxicillin twice daily x 10d        SUBJECTIVE:    Chief Complaint:  Chief Complaint   Patient presents with    Other     Request to see patient for possible dental infection.       History of Present Illness:    Jonathon Patricia is a 44 y.o. male who is seen for an [facility] Primary [facility] At Home Home Visit. It would be physically and emotionally difficult to take the patient from the home to seek primary care services in the community. The patient is homebound as a result of very limited mobility due to multiple medical problems including Autism and Intellectual disability.  He has been indicating a need for tylenol for pain relief, hitting at the side of his face repeatedly. Mother has attempted to take him to dental appointments, under anesthesia even, but his behavior has prevented any evaluation.    The patient has a number of chronic medical problems as listed above.    The history is obtained from the patient, family and previous records and is felt to be satisfactory to establish an acceptable plan of care. Health status indicators were reviewed and there are no changes except as noted above. The past medical history, family history, social history and ethnic considerations have been updated as needed. The patient denies any constitutional, ENT, cardiopulmonary, gastrointestinal, or genitourinary issues otherwise as review in the ROS.      Review of Symptoms: history obtained by caregiver  No fever  Eating and drinking normally  Past Medical History:  Past Medical History:   Diagnosis Date    Autism        Past Surgical History:  Past Surgical History:   Procedure Laterality Date    DENTAL EXAMINATION UNDER ANESTHESIA

## 2025-07-07 NOTE — TELEPHONE ENCOUNTER
Telephone call returned to Karen, she has just picked up the rx from pharmacy and is taking to the home.  Advised that no follow up visit is scheduled.

## 2025-07-07 NOTE — TELEPHONE ENCOUNTER
The patient's mother Tasha called to give updated insurance information. Amie w/ id # [id number]. Updated in the system. Also she states he has a bad toothache and is asking if his PCP can come out to see him. They have been giving him Tylenol and not working. He has lost two teeth and not had dental work since 2009.

## 2025-07-07 NOTE — TELEPHONE ENCOUNTER
Sia with the group home is calling to confirm what antibiotic was called in for the patient. Also wants to know if Dr. Campbell will have to do a f/u visit?  # [phone]

## 2025-07-21 ENCOUNTER — TELEPHONE (OUTPATIENT)
Facility: CLINIC | Age: 45
End: 2025-07-21

## 2025-07-21 NOTE — TELEPHONE ENCOUNTER
Tasha Patricia called to report [facility] has told her that Dr. Lori Campbell is not in network with the [place]'s [place] [employer] [facility] insurance.  I let Tasha know that I would f/u with administration and call her back.    Email to Jaden to f/u on getting Dr. Campbell in network with this plan.    Tasha's callback if needed is [phone]

## 2025-07-30 ENCOUNTER — TELEPHONE (OUTPATIENT)
Facility: CLINIC | Age: 45
End: 2025-07-30

## 2025-07-30 NOTE — TELEPHONE ENCOUNTER
[facility] Primary Care at Home ([facility])   Phone:  [phone]      Fax:  [phone] [address], [address]  [place] [address]    Name:  Jonathon Patricia  YOB: 1980    Called provider number for [facility].  Spoke with representative Tariq, call reference # [id number].   She states that Dr. Lori Campbell is in-network with patient's insurance plan.      This nurse called patient's mother to inform her, no answer, left message.      Phuong Mckeon RN, Gerontological Nursing-BC, PN

## 2025-07-30 NOTE — TELEPHONE ENCOUNTER
Telephone call returned to mom Tasha.  She reports that she spoke with Phuong a couple of week ago that pt's insurance has changed to [employer] and member services advised her that Dr. Lori Campbell is not in network.  Tasha states she was supposed to get a call back but has not heard from anyone.  Advised mom that this nurse will follow up with Phuong Mckeon RN and one of us will call her back with additional information.

## 2025-09-04 ENCOUNTER — TELEPHONE (OUTPATIENT)
Facility: CLINIC | Age: 45
End: 2025-09-04